# Patient Record
Sex: MALE | Race: WHITE | NOT HISPANIC OR LATINO | Employment: OTHER | URBAN - METROPOLITAN AREA
[De-identification: names, ages, dates, MRNs, and addresses within clinical notes are randomized per-mention and may not be internally consistent; named-entity substitution may affect disease eponyms.]

---

## 2020-03-31 ENCOUNTER — HOSPITAL ENCOUNTER (EMERGENCY)
Facility: HOSPITAL | Age: 70
Discharge: HOME | End: 2020-04-01
Attending: EMERGENCY MEDICINE

## 2020-03-31 ENCOUNTER — APPOINTMENT (OUTPATIENT)
Dept: CT IMAGING | Facility: HOSPITAL | Age: 70
End: 2020-03-31

## 2020-03-31 DIAGNOSIS — E87.1 HYPONATREMIA: ICD-10-CM

## 2020-03-31 DIAGNOSIS — N39.0 URINARY TRACT INFECTION WITHOUT HEMATURIA, SITE UNSPECIFIED: Primary | ICD-10-CM

## 2020-03-31 DIAGNOSIS — R10.9 FLANK PAIN: ICD-10-CM

## 2020-03-31 LAB
ALBUMIN SERPL-MCNC: 4.4 G/DL (ref 3.5–5.2)
ALBUMIN/GLOB SERPL: 1.2 G/DL
ALP SERPL-CCNC: 65 U/L (ref 39–117)
ALT SERPL W P-5'-P-CCNC: 15 U/L (ref 1–41)
ANION GAP SERPL CALCULATED.3IONS-SCNC: 14.4 MMOL/L (ref 5–15)
AST SERPL-CCNC: 24 U/L (ref 1–40)
BACTERIA UR QL AUTO: ABNORMAL /HPF
BASOPHILS # BLD AUTO: 0.04 10*3/MM3 (ref 0–0.2)
BASOPHILS NFR BLD AUTO: 0.3 % (ref 0–1.5)
BILIRUB SERPL-MCNC: 0.6 MG/DL (ref 0.2–1.2)
BILIRUB UR QL STRIP: NEGATIVE
BUN BLD-MCNC: 7 MG/DL (ref 8–23)
BUN/CREAT SERPL: 10 (ref 7–25)
CALCIUM SPEC-SCNC: 9.8 MG/DL (ref 8.6–10.5)
CHLORIDE SERPL-SCNC: 84 MMOL/L (ref 98–107)
CLARITY UR: CLEAR
CO2 SERPL-SCNC: 22.6 MMOL/L (ref 22–29)
COLOR UR: YELLOW
CREAT BLD-MCNC: 0.7 MG/DL (ref 0.76–1.27)
DEPRECATED RDW RBC AUTO: 42.5 FL (ref 37–54)
EOSINOPHIL # BLD AUTO: 0.06 10*3/MM3 (ref 0–0.4)
EOSINOPHIL NFR BLD AUTO: 0.5 % (ref 0.3–6.2)
ERYTHROCYTE [DISTWIDTH] IN BLOOD BY AUTOMATED COUNT: 12.9 % (ref 12.3–15.4)
GFR SERPL CREATININE-BSD FRML MDRD: 112 ML/MIN/1.73
GLOBULIN UR ELPH-MCNC: 3.6 GM/DL
GLUCOSE BLD-MCNC: 112 MG/DL (ref 65–99)
GLUCOSE UR STRIP-MCNC: NEGATIVE MG/DL
HCT VFR BLD AUTO: 48.1 % (ref 37.5–51)
HGB BLD-MCNC: 17.1 G/DL (ref 13–17.7)
HGB UR QL STRIP.AUTO: ABNORMAL
HYALINE CASTS UR QL AUTO: ABNORMAL /LPF
IMM GRANULOCYTES # BLD AUTO: 0.12 10*3/MM3 (ref 0–0.05)
IMM GRANULOCYTES NFR BLD AUTO: 0.9 % (ref 0–0.5)
KETONES UR QL STRIP: ABNORMAL
LEUKOCYTE ESTERASE UR QL STRIP.AUTO: NEGATIVE
LYMPHOCYTES # BLD AUTO: 1.52 10*3/MM3 (ref 0.7–3.1)
LYMPHOCYTES NFR BLD AUTO: 11.4 % (ref 19.6–45.3)
MCH RBC QN AUTO: 32.3 PG (ref 26.6–33)
MCHC RBC AUTO-ENTMCNC: 35.6 G/DL (ref 31.5–35.7)
MCV RBC AUTO: 90.8 FL (ref 79–97)
MONOCYTES # BLD AUTO: 1.35 10*3/MM3 (ref 0.1–0.9)
MONOCYTES NFR BLD AUTO: 10.2 % (ref 5–12)
NEUTROPHILS # BLD AUTO: 10.2 10*3/MM3 (ref 1.7–7)
NEUTROPHILS NFR BLD AUTO: 76.7 % (ref 42.7–76)
NITRITE UR QL STRIP: NEGATIVE
NRBC BLD AUTO-RTO: 0 /100 WBC (ref 0–0.2)
PH UR STRIP.AUTO: 5.5 [PH] (ref 4.5–8)
PLATELET # BLD AUTO: 274 10*3/MM3 (ref 140–450)
PMV BLD AUTO: 9.6 FL (ref 6–12)
POTASSIUM BLD-SCNC: 3.8 MMOL/L (ref 3.5–5.2)
PROT SERPL-MCNC: 8 G/DL (ref 6–8.5)
PROT UR QL STRIP: ABNORMAL
RBC # BLD AUTO: 5.3 10*6/MM3 (ref 4.14–5.8)
RBC # UR: ABNORMAL /HPF
REF LAB TEST METHOD: ABNORMAL
SODIUM BLD-SCNC: 121 MMOL/L (ref 136–145)
SP GR UR STRIP: 1.02 (ref 1–1.03)
SQUAMOUS #/AREA URNS HPF: ABNORMAL /HPF
UROBILINOGEN UR QL STRIP: ABNORMAL
WBC NRBC COR # BLD: 13.29 10*3/MM3 (ref 3.4–10.8)
WBC UR QL AUTO: ABNORMAL /HPF

## 2020-03-31 PROCEDURE — 96365 THER/PROPH/DIAG IV INF INIT: CPT

## 2020-03-31 PROCEDURE — 74176 CT ABD & PELVIS W/O CONTRAST: CPT

## 2020-03-31 PROCEDURE — 25010000002 MORPHINE PER 10 MG: Performed by: EMERGENCY MEDICINE

## 2020-03-31 PROCEDURE — 81001 URINALYSIS AUTO W/SCOPE: CPT | Performed by: PHYSICIAN ASSISTANT

## 2020-03-31 PROCEDURE — 99284 EMERGENCY DEPT VISIT MOD MDM: CPT | Performed by: EMERGENCY MEDICINE

## 2020-03-31 PROCEDURE — 25010000002 THIAMINE PER 100 MG: Performed by: EMERGENCY MEDICINE

## 2020-03-31 PROCEDURE — 96375 TX/PRO/DX INJ NEW DRUG ADDON: CPT

## 2020-03-31 PROCEDURE — 99284 EMERGENCY DEPT VISIT MOD MDM: CPT

## 2020-03-31 PROCEDURE — 96366 THER/PROPH/DIAG IV INF ADDON: CPT

## 2020-03-31 PROCEDURE — 25010000002 ONDANSETRON PER 1 MG: Performed by: PHYSICIAN ASSISTANT

## 2020-03-31 PROCEDURE — 85025 COMPLETE CBC W/AUTO DIFF WBC: CPT | Performed by: PHYSICIAN ASSISTANT

## 2020-03-31 PROCEDURE — 80053 COMPREHEN METABOLIC PANEL: CPT | Performed by: PHYSICIAN ASSISTANT

## 2020-03-31 RX ORDER — ONDANSETRON 2 MG/ML
4 INJECTION INTRAMUSCULAR; INTRAVENOUS ONCE
Status: COMPLETED | OUTPATIENT
Start: 2020-03-31 | End: 2020-03-31

## 2020-03-31 RX ORDER — THIAMINE HYDROCHLORIDE 100 MG/ML
INJECTION, SOLUTION INTRAMUSCULAR; INTRAVENOUS
Status: DISCONTINUED
Start: 2020-03-31 | End: 2020-04-01 | Stop reason: HOSPADM

## 2020-03-31 RX ORDER — RETINOL, ERGOCALCIFEROL, .ALPHA.-TOCOPHEROL ACETATE, DL-, PHYTONADIONE, ASCORBIC ACID, NIACINAMIDE, RIBOFLAVIN 5-PHOSPHATE SODIUM, THIAMINE HYDROCHLORIDE, PYRIDOXINE HYDROCHLORIDE, DEXPANTHENOL, BIOTIN, FOLIC ACID, AND CYANOCOBALAMIN 200-150/10
KIT INTRAVENOUS
Status: DISCONTINUED
Start: 2020-03-31 | End: 2020-04-01 | Stop reason: HOSPADM

## 2020-03-31 RX ORDER — SODIUM CHLORIDE 0.9 % (FLUSH) 0.9 %
10 SYRINGE (ML) INJECTION AS NEEDED
Status: DISCONTINUED | OUTPATIENT
Start: 2020-03-31 | End: 2020-04-01 | Stop reason: HOSPADM

## 2020-03-31 RX ADMIN — THIAMINE HYDROCHLORIDE 250 ML/HR: 100 INJECTION, SOLUTION INTRAMUSCULAR; INTRAVENOUS at 23:37

## 2020-03-31 RX ADMIN — ONDANSETRON 4 MG: 2 INJECTION INTRAMUSCULAR; INTRAVENOUS at 23:09

## 2020-03-31 RX ADMIN — MORPHINE SULFATE 4 MG: 4 INJECTION INTRAVENOUS at 23:31

## 2020-04-01 VITALS
WEIGHT: 148 LBS | RESPIRATION RATE: 20 BRPM | HEART RATE: 88 BPM | SYSTOLIC BLOOD PRESSURE: 183 MMHG | TEMPERATURE: 97.5 F | DIASTOLIC BLOOD PRESSURE: 75 MMHG | BODY MASS INDEX: 25.27 KG/M2 | OXYGEN SATURATION: 92 % | HEIGHT: 64 IN

## 2020-04-01 PROCEDURE — 96366 THER/PROPH/DIAG IV INF ADDON: CPT

## 2020-04-01 RX ORDER — CEFUROXIME AXETIL 250 MG/1
500 TABLET ORAL ONCE
Status: COMPLETED | OUTPATIENT
Start: 2020-04-01 | End: 2020-04-01

## 2020-04-01 RX ADMIN — CEFUROXIME AXETIL 500 MG: 250 TABLET, FILM COATED ORAL at 01:59

## 2020-04-01 NOTE — DISCHARGE INSTRUCTIONS
Start taking the antibiotic medication that you were previously prescribed by your primary care doctor this week.  Stop drinking alcohol.  Please return to the emergency room for any worsening pain, fevers, nausea, vomiting, weakness, difficulties urinating or any other concerns.

## 2020-04-01 NOTE — ED PROVIDER NOTES
EMERGENCY DEPARTMENT ENCOUNTER      Room Number: 4/04    History is provided by the patient, no translation services needed    HPI:    Chief complaint: Back pain, abdominal pain, nausea    Location: Bilateral thoracic/lumbar region    Quality/Severity: 8/10, sharp    Timing/Duration: Pain began yesterday    Modifying Factors: Saw PCP today, informed she had a UTI, was given injection of unknown antibiotics, patient given an unknown prescription, has not yet filled this.    Associated Symptoms: Positive for back pain, abdominal pain, nausea.  Denies any chest pain, shortness of air, vomiting, diarrhea, constipation, dysuria, hematuria, urgency or frequency to urinate.    Narrative: Pt is a 69 y.o. male who presents complaining of the above symptoms.  Patient reports he began having pain in his back yesterday, he denies any injury.  He is also experiencing some left lower quadrant abdominal pain and nausea that have seemed to worsen throughout the day.  He states he was seen by his PCP earlier today and given a dose of IM antibiotics.  He has not yet started taking oral antibiotics.  He denies any history of kidney stones.  He does have a history of emphysema, pancreatitis, HTN, HLD, hypothyroidism, and arthritis.     PMD: Lokesh Seay MD    REVIEW OF SYSTEMS  Review of Systems   Constitutional: Negative for chills and fever.   Respiratory: Negative for cough and shortness of breath.    Cardiovascular: Negative for chest pain and palpitations.   Gastrointestinal: Positive for abdominal pain and nausea. Negative for blood in stool, constipation, diarrhea and vomiting.   Genitourinary: Positive for flank pain (Left). Negative for difficulty urinating, dysuria, frequency, hematuria and urgency.   Musculoskeletal: Positive for back pain. Negative for neck pain.   Skin: Negative for pallor and rash.   Neurological: Negative for dizziness and syncope.   Psychiatric/Behavioral: Negative for confusion. The patient is  not nervous/anxious.          PAST MEDICAL HISTORY  Active Ambulatory Problems     Diagnosis Date Noted   • Chronic pancreatitis (CMS/HCC) 03/14/2016     Resolved Ambulatory Problems     Diagnosis Date Noted   • No Resolved Ambulatory Problems     Past Medical History:   Diagnosis Date   • Arthritis    • Emphysema lung (CMS/HCC)    • High cholesterol    • Hypertension    • Pancreatitis    • Shortness of breath    • Thyroid disease        PAST SURGICAL HISTORY  Past Surgical History:   Procedure Laterality Date   • CATARACT EXTRACTION     • HEMORRHOIDECTOMY     • NEPHRECTOMY PARTIAL         FAMILY HISTORY  Family History   Problem Relation Age of Onset   • Stroke Mother        SOCIAL HISTORY  Social History     Socioeconomic History   • Marital status: Single     Spouse name: Not on file   • Number of children: Not on file   • Years of education: Not on file   • Highest education level: Not on file   Tobacco Use   • Smoking status: Current Every Day Smoker     Packs/day: 1.50     Types: Cigarettes   Substance and Sexual Activity   • Alcohol use: Yes     Alcohol/week: 35.0 standard drinks     Types: 35 Cans of beer per week     Comment: 5-6 beer per day   • Drug use: Never   • Sexual activity: Defer       ALLERGIES  Patient has no known allergies.      Current Facility-Administered Medications:   •  multiple vitamin (M.V.I. Adult) injection  - ADS Override Pull, , , ,   •  [COMPLETED] Insert peripheral IV, , , Once **AND** sodium chloride 0.9 % flush 10 mL, 10 mL, Intravenous, PRN, Inessa Boggs PA-C  •  thiamine (B-1) 100 MG/ML injection  - ADS Override Pull, , , ,     Current Outpatient Medications:   •  albuterol (PROVENTIL HFA;VENTOLIN HFA) 108 (90 BASE) MCG/ACT inhaler, ProAir  (90 Base) MCG/ACT Inhalation Aerosol Solution; Patient Sig: ProAir  (90 Base) MCG/ACT Inhalation Aerosol Solution ; 8; 0; 19-Nov-2014; Active, Disp: , Rfl:   •  amLODIPine (NORVASC) 10 MG tablet, Take by mouth., Disp:  , Rfl:   •  levothyroxine (SYNTHROID, LEVOTHROID) 50 MCG tablet, Take by mouth., Disp: , Rfl:   •  omeprazole (PriLOSEC) 40 MG capsule, Take by mouth., Disp: , Rfl:   •  polyethylene glycol (GoLYTELY) 236 G solution, Take by mouth. As directed, Disp: , Rfl:   •  rosuvastatin (CRESTOR) 40 MG tablet, Take by mouth., Disp: , Rfl:   •  tiotropium (SPIRIVA) 18 MCG per inhalation capsule, Spiriva HandiHaler 18 MCG Inhalation Capsule; Patient Sig: Spiriva HandiHaler 18 MCG Inhalation Capsule ; 30; 0; 19-Nov-2014; Active, Disp: , Rfl:   •  ZENPEP 84935 UNITS capsule delayed-release particles capsule, TAKE TWO CAPSULES BY MOUTH THREE TIMES A DAY BEFORE MEALS, Disp: 180 capsule, Rfl: 11    PHYSICAL EXAM  ED Triage Vitals   Temp Heart Rate Resp BP SpO2   03/31/20 2213 03/31/20 2213 03/31/20 2222 03/31/20 2222 03/31/20 2213   97.5 °F (36.4 °C) 88 20 178/82 97 %      Temp src Heart Rate Source Patient Position BP Location FiO2 (%)   03/31/20 2213 03/31/20 2213 03/31/20 2213 03/31/20 2213 --   Oral Monitor Sitting Left arm        Physical Exam   Constitutional: He is oriented to person, place, and time and well-developed, well-nourished, and in no distress.  Non-toxic appearance. No distress.   HENT:   Head: Normocephalic and atraumatic.   Mouth/Throat: Uvula is midline, oropharynx is clear and moist and mucous membranes are normal.   Eyes: Pupils are equal, round, and reactive to light. Conjunctivae are normal.   Cardiovascular: Normal rate, regular rhythm and intact distal pulses.   Pulmonary/Chest: Effort normal. He has wheezes (Expiratory wheezing throughout all fields).   Abdominal: Soft. Bowel sounds are normal. There is tenderness in the left lower quadrant. There is CVA tenderness (Left). There is no guarding.   Musculoskeletal: Normal range of motion. He exhibits no edema.   Neurological: He is alert and oriented to person, place, and time. GCS score is 15.   Skin: Skin is warm and dry.   Psychiatric: Mood, memory,  affect and judgment normal.   Nursing note and vitals reviewed.        LAB RESULTS  Results for orders placed or performed during the hospital encounter of 03/31/20   Urinalysis With Microscopic If Indicated (No Culture) - Urine, Clean Catch   Result Value Ref Range    Color, UA Yellow Yellow, Straw    Appearance, UA Clear Clear    pH, UA 5.5 4.5 - 8.0    Specific Gravity, UA 1.020 1.003 - 1.030    Glucose, UA Negative Negative    Ketones, UA 15 mg/dL (1+) (A) Negative    Bilirubin, UA Negative Negative    Blood, UA Trace (A) Negative    Protein,  mg/dL (2+) (A) Negative    Leuk Esterase, UA Negative Negative    Nitrite, UA Negative Negative    Urobilinogen, UA 0.2 E.U./dL 0.2 - 1.0 E.U./dL   Comprehensive Metabolic Panel   Result Value Ref Range    Glucose 112 (H) 65 - 99 mg/dL    BUN 7 (L) 8 - 23 mg/dL    Creatinine 0.70 (L) 0.76 - 1.27 mg/dL    Sodium 121 (L) 136 - 145 mmol/L    Potassium 3.8 3.5 - 5.2 mmol/L    Chloride 84 (L) 98 - 107 mmol/L    CO2 22.6 22.0 - 29.0 mmol/L    Calcium 9.8 8.6 - 10.5 mg/dL    Total Protein 8.0 6.0 - 8.5 g/dL    Albumin 4.40 3.50 - 5.20 g/dL    ALT (SGPT) 15 1 - 41 U/L    AST (SGOT) 24 1 - 40 U/L    Alkaline Phosphatase 65 39 - 117 U/L    Total Bilirubin 0.6 0.2 - 1.2 mg/dL    eGFR Non African Amer 112 >60 mL/min/1.73    Globulin 3.6 gm/dL    A/G Ratio 1.2 g/dL    BUN/Creatinine Ratio 10.0 7.0 - 25.0    Anion Gap 14.4 5.0 - 15.0 mmol/L   CBC Auto Differential   Result Value Ref Range    WBC 13.29 (H) 3.40 - 10.80 10*3/mm3    RBC 5.30 4.14 - 5.80 10*6/mm3    Hemoglobin 17.1 13.0 - 17.7 g/dL    Hematocrit 48.1 37.5 - 51.0 %    MCV 90.8 79.0 - 97.0 fL    MCH 32.3 26.6 - 33.0 pg    MCHC 35.6 31.5 - 35.7 g/dL    RDW 12.9 12.3 - 15.4 %    RDW-SD 42.5 37.0 - 54.0 fl    MPV 9.6 6.0 - 12.0 fL    Platelets 274 140 - 450 10*3/mm3    Neutrophil % 76.7 (H) 42.7 - 76.0 %    Lymphocyte % 11.4 (L) 19.6 - 45.3 %    Monocyte % 10.2 5.0 - 12.0 %    Eosinophil % 0.5 0.3 - 6.2 %    Basophil % 0.3  0.0 - 1.5 %    Immature Grans % 0.9 (H) 0.0 - 0.5 %    Neutrophils, Absolute 10.20 (H) 1.70 - 7.00 10*3/mm3    Lymphocytes, Absolute 1.52 0.70 - 3.10 10*3/mm3    Monocytes, Absolute 1.35 (H) 0.10 - 0.90 10*3/mm3    Eosinophils, Absolute 0.06 0.00 - 0.40 10*3/mm3    Basophils, Absolute 0.04 0.00 - 0.20 10*3/mm3    Immature Grans, Absolute 0.12 (H) 0.00 - 0.05 10*3/mm3    nRBC 0.0 0.0 - 0.2 /100 WBC   Urinalysis, Microscopic Only - Urine, Clean Catch   Result Value Ref Range    RBC, UA 0-2 (A) None Seen /HPF    WBC, UA 3-5 (A) None Seen /HPF    Bacteria, UA Trace (A) None Seen /HPF    Squamous Epithelial Cells, UA 0-2 None Seen, 0-2 /HPF    Hyaline Casts, UA None Seen None Seen /LPF    Methodology Manual Light Microscopy          I ordered the above labs and reviewed the results    RADIOLOGY  RADIOLOGY        Study: CT abdomen and pelvis without contrast    Findings: 1. Motion degraded exam.  2. No ureteral stones and no hydronephrosis. Calcifications in the kidneys are likely all vascular, but small nonobstructing kidney stones may be present as well.  3. Fat stranding around both kidneys may indicate pyelonephritis. Correlate with urinalysis results.  4. Chronic calcific pancreatitis. No evidence of acute pancreatitis at this time.  5. No acute findings in the GI tract. Normal appendix.  6. Additional findings as above.    Interpreted contemporaneously with treatment by Dr. Mendoza, independently viewed by me      I ordered the above radiologic testing and reviewed the results    PROCEDURES  Procedures      PROGRESS AND CONSULTS  ED Course as of Apr 01 0459   Tue Mar 31, 2020   0266 Patient presented with complaints of generalized thoracic/lumbar back pain, and generalized lower abdominal pain.  After performing physical exam patient is found to be tender in the left flank and left lower quadrant of his abdomen.  CT abdomen pelvis without contrast ordered to evaluate for stone disease.  Patient's case was handed  "over to Dr. Treviño who is in agreement with plan of care.    [KS]   Wed Apr 01, 2020 0215 I have reviewed the labs and the CT report from today's visit.  CT indicates nonspecific inflammatory changes around the kidneys which could represent UTI pyelonephritis problem.  Patient does have trace bacteria in the urine and 3-5 white blood cells.  He was started on antibiotic therapy yesterday when he received an intramuscular injection by his PCP.  He never started his oral prescription tablet antibiotics, however.  I will start him on Ceftin antibiotics right now.  I also find that his sodium is rather low at 121.  Careful review of his records indicates that this is probably more of a chronic problem.  I suspect is related to his alcohol abuse and poor nutrition overall.  I have started a banana bag on him with 0.9% normal saline.  We will infuse that slowly over several hours tonight.  Assuming he does well his pain is well controlled, I think he can probably be discharged early in the morning.  I will of course insist that he starts taking the antibiotic prescription that he was originally prescribed yesterday.  I encouraged him to follow-up with his primary care doctor.  Also reviewed with him the usual \"return to ER\" instructions for any worsening signs or symptoms prior to his departure.    [RICKY]      ED Course User Index  [RICKY] David Treviño MD  [KS] Inessa Boggs PA-C           MEDICAL DECISION MAKING  Results were reviewed/discussed with the patient and they were also made aware of online access. Pt also made aware that some labs, such as cultures, will not be resulted during ER visit and follow up with PMD is necessary.     MDM        My differential diagnosis for back pain includes but is not limited to:  Musculoskeletal strain, contusion, retroperitoneal hematoma, disc protrusion, vertebral fracture, transverse process fracture, rib fracture, facet syndrome, sacroiliac joint strain, sciatica, renal " injury, splenic injury, pancreatic injury, osteoarthritis, lumbar spondylosis, spinal stenosis, ankylosing spondylitis, sacroiliac joint inflammation, pancreatitis, perforated peptic ulcer, diverticulitis, endometriosis, chronic PID, epidural abscess, osteomyelitis, retroperitoneal abscess, pyelonephritis, pneumonia, subphrenic abscess, tuberculosis, neurofibroma, meningioma, multiple myeloma, lymphoma, metastatic cancer, primary cancer, AAA, aortic dissection, spinal ischemia, referred pain, ureterolithiasis      DIAGNOSIS  Final diagnoses:   Urinary tract infection without hematuria, site unspecified   Flank pain   Hyponatremia       Latest Documented Vital Signs:  As of 04:59  BP- (!) 183/75 HR- 88 Temp- 97.5 °F (36.4 °C) (Oral) O2 sat- 92%    DISPOSITION  Home    Discussed pertinent labs and imaging findings with the patient/family.  Patient/Family voiced understanding of need to follow-up for recheck, further testing as needed.  Return to the emergency Department warnings were given.         Medication List      No changes were made to your prescriptions during this visit.           Follow-up Information     Lokesh Seay MD. Call today.    Specialty:  Family Medicine  Why:  Call your doctor today to schedule a prompt follow-up appointment this week for repeat evaluation as we discussed.  Contact information:  [address] [place] [address] [address]  [facility] [id number] [phone]                     Dictated utilizing Dragon dictation       David Treviño MD  04/01/20 5983

## 2020-06-17 ENCOUNTER — TRANSCRIBE ORDERS (OUTPATIENT)
Dept: ADMINISTRATIVE | Facility: HOSPITAL | Age: 70
End: 2020-06-17

## 2020-06-17 DIAGNOSIS — Z12.2 ENCOUNTER FOR SCREENING FOR LUNG CANCER: ICD-10-CM

## 2020-06-17 DIAGNOSIS — R94.31 ABNORMAL EKG: Primary | ICD-10-CM

## 2020-06-22 ENCOUNTER — APPOINTMENT (OUTPATIENT)
Dept: CARDIOLOGY | Facility: HOSPITAL | Age: 70
End: 2020-06-22

## 2021-07-09 ENCOUNTER — OFFICE VISIT (OUTPATIENT)
Dept: CARDIOLOGY | Facility: CLINIC | Age: 71
End: 2021-07-09

## 2021-07-09 VITALS
HEART RATE: 100 BPM | WEIGHT: 155 LBS | SYSTOLIC BLOOD PRESSURE: 120 MMHG | DIASTOLIC BLOOD PRESSURE: 70 MMHG | HEIGHT: 64 IN | BODY MASS INDEX: 26.46 KG/M2 | OXYGEN SATURATION: 99 %

## 2021-07-09 DIAGNOSIS — I10 ESSENTIAL HYPERTENSION: ICD-10-CM

## 2021-07-09 DIAGNOSIS — J43.8 OTHER EMPHYSEMA (HCC): ICD-10-CM

## 2021-07-09 DIAGNOSIS — R06.09 DYSPNEA ON EXERTION: ICD-10-CM

## 2021-07-09 DIAGNOSIS — E78.2 MIXED HYPERLIPIDEMIA: ICD-10-CM

## 2021-07-09 DIAGNOSIS — I48.91 ATRIAL FIBRILLATION, UNSPECIFIED TYPE (HCC): Primary | ICD-10-CM

## 2021-07-09 DIAGNOSIS — K86.1 CHRONIC PANCREATITIS, UNSPECIFIED PANCREATITIS TYPE (HCC): ICD-10-CM
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
## 2021-07-09 PROBLEM — S01.01XA LACERATION OF SCALP WITHOUT FOREIGN BODY: Status: ACTIVE | Noted: 2019-12-20

## 2021-07-09 PROBLEM — E87.1 HYPONATREMIA: Status: ACTIVE | Noted: 2019-12-20

## 2021-07-09 PROBLEM — R06.2 WHEEZING: Status: ACTIVE | Noted: 2019-12-21

## 2021-07-09 PROBLEM — F10.929 ALCOHOL INTOXICATION (HCC): Status: ACTIVE | Noted: 2019-12-20

## 2021-07-09 PROCEDURE — 93000 ELECTROCARDIOGRAM COMPLETE: CPT | Performed by: INTERNAL MEDICINE

## 2021-07-09 PROCEDURE — 99204 OFFICE O/P NEW MOD 45 MIN: CPT | Performed by: INTERNAL MEDICINE

## 2021-07-09 NOTE — PROGRESS NOTES
Date of Office Visit: 2021  Encounter Provider: Eloisa Wheeler MD  Place of Service: [facility] CARDIOLOGY  Patient Name: Jose Manuel Wray  :1950      Patient ID:  Jose Manuel Wray is a 70 y.o. male is here for atrial fibrillation.          History of Present Illness    He has a history of hypertension, hyperlipidemia, COPD, hypothyroidism, history of pancreatitis.    He is  and lives alone, is retired , smokes 1ppd cigarettes for 55 years, 2 to 3 cups of coffee per day, 2 cans of Pepsi per day, 2-3 beers daily.  He has a history of alcoholism.  His mother had strokes.    He was at his PCP office and was found to have new atrial fibrillation.  He does not feels heart racing or skipping and has had no dizziness or syncope.  He has no nausea.  He has no orthopnea or PND.  He has chronic dyspnea on exertion which he thinks is due to COPD.  He has had no exertional chest tightness or pressure.  He has no fevers, chills or cough.  He has no lower extremity edema.  Labs done 2021 show normal CMP, total cholesterol 202, triglycerides 41, HDL 06, LDL 88, normal TSH, normal PSA, normal CBC.    Past Medical History:   Diagnosis Date   • Arthritis    • Emphysema lung (CMS/HCC)    • High cholesterol    • Hypertension    • Pancreatitis    • Shortness of breath    • Thyroid disease          Past Surgical History:   Procedure Laterality Date   • CATARACT EXTRACTION     • HEMORRHOIDECTOMY     • NEPHRECTOMY PARTIAL         Current Outpatient Medications on File Prior to Visit   Medication Sig Dispense Refill   • albuterol (PROVENTIL HFA;VENTOLIN HFA) 108 (90 BASE) MCG/ACT inhaler ProAir  (90 Base) MCG/ACT Inhalation Aerosol Solution; Patient Sig: ProAir  (90 Base) MCG/ACT Inhalation Aerosol Solution ; 8; 0; -2014; Active     • amLODIPine (NORVASC) 10 MG tablet Take by mouth.     • levothyroxine (SYNTHROID, LEVOTHROID) 50 MCG tablet Take by  "mouth.     • omeprazole (PriLOSEC) 40 MG capsule Take by mouth.     • polyethylene glycol (GoLYTELY) 236 G solution Take by mouth. As directed     • rosuvastatin (CRESTOR) 40 MG tablet Take by mouth.     • tiotropium (SPIRIVA) 18 MCG per inhalation capsule Spiriva HandiHaler 18 MCG Inhalation Capsule; Patient Sig: Spiriva HandiHaler 18 MCG Inhalation Capsule ; 30; 0; 19-Nov-2014; Active     • ZENPEP 53701 UNITS capsule delayed-release particles capsule TAKE TWO CAPSULES BY MOUTH THREE TIMES A DAY BEFORE MEALS 180 capsule 11     No current facility-administered medications on file prior to visit.       Social History     Socioeconomic History   • Marital status: Single     Spouse name: Not on file   • Number of children: Not on file   • Years of education: Not on file   • Highest education level: Not on file   Tobacco Use   • Smoking status: Current Every Day Smoker     Packs/day: 1.50     Types: Cigarettes   • Smokeless tobacco: Never Used   Vaping Use   • Vaping Use: Never assessed   Substance and Sexual Activity   • Alcohol use: Yes     Alcohol/week: 35.0 standard drinks     Types: 35 Cans of beer per week     Comment: 5-6 beer per day   • Drug use: Never   • Sexual activity: Defer           ROS    Procedures    ECG 12 Lead    Date/Time: 7/9/2021 11:39 AM  Performed by: Eloisa Wheeler MD  Authorized by: Eloisa Wheeler MD   Comparison: compared with previous ECG   Similar to previous ECG  Rhythm: atrial fibrillation    Clinical impression: abnormal EKG                Objective:      Vitals:    07/09/21 1114   BP: 120/70   Pulse: 100   SpO2: 99%   Weight: 70.3 kg (155 lb)   Height: 162.6 cm (64\")     Body mass index is 26.61 kg/m².    Vitals reviewed.   Constitutional:       General: Not in acute distress.     Appearance: Well-developed. Not diaphoretic.   Eyes:      General: No scleral icterus.     Conjunctiva/sclera: Conjunctivae normal.   HENT:      Head: Normocephalic and atraumatic.   Neck:      " Thyroid: No thyromegaly.      Vascular: No carotid bruit or JVD.      Lymphadenopathy: No cervical adenopathy.   Pulmonary:      Effort: Pulmonary effort is normal. No respiratory distress.      Breath sounds: Decreased breath sounds present. No wheezing. No rhonchi. No rales.   Chest:      Chest wall: Not tender to palpatation.   Cardiovascular:      Tachycardia present. Irregularly irregular rhythm.      Murmurs: There is no murmur.      No gallop.   Pulses:     Intact distal pulses.   Edema:     Peripheral edema absent.   Abdominal:      General: Bowel sounds are normal. There is no distension or abdominal bruit.      Palpations: Abdomen is soft. There is no abdominal mass.      Tenderness: There is no abdominal tenderness.   Musculoskeletal:         General: No deformity.      Extremities: No clubbing present.     Cervical back: Neck supple. Skin:     General: Skin is warm and dry. There is no cyanosis.      Coloration: Skin is not pale.      Findings: No rash.   Neurological:      Mental Status: Alert and oriented to person, place, and time.      Cranial Nerves: No cranial nerve deficit.   Psychiatric:         Judgment: Judgment normal.         Lab Review:       Assessment:      Diagnosis Plan   1. Atrial fibrillation, unspecified type (CMS/HCC)  Adult Transthoracic Echo Complete W/ Cont if Necessary Per Protocol    Stress Test With Myocardial Perfusion One Day   2. Chronic pancreatitis, unspecified pancreatitis type (CMS/HCC)     3. Dyspnea on exertion  Adult Transthoracic Echo Complete W/ Cont if Necessary Per Protocol    Stress Test With Myocardial Perfusion One Day   4. Essential hypertension     5. Mixed hyperlipidemia     6. Other emphysema (CMS/HCC)       1. New onset atrial fibrillation.  Start Eliquis 5 mg twice daily and metoprolol 25 twice daily.  2. COPD  3. Tobacco use, advised cessation but he says he is been tried for 25 years and cannot.  4. Hypertension, well controlled  5. Hyperlipidemia, on  rosuvastatin.  6. History of alcoholism  7. History of pancreatitis     Plan:       Start Eliquis 5 mg twice daily, set up as nuclear perfusion study to be done at [facility] as well as echocardiogram.  See Radha in 6 to 8 weeks to reevaluate heart rate.  Start Lopressor 25 mg twice daily.

## 2021-07-21 ENCOUNTER — HOSPITAL ENCOUNTER (OUTPATIENT)
Dept: CARDIOLOGY | Facility: HOSPITAL | Age: 71
Discharge: HOME | End: 2021-07-21

## 2021-07-21 ENCOUNTER — HOSPITAL ENCOUNTER (OUTPATIENT)
Dept: NUCLEAR MEDICINE | Facility: HOSPITAL | Age: 71
Discharge: HOME | End: 2021-07-21

## 2021-07-21 ENCOUNTER — TELEPHONE (OUTPATIENT)
Dept: CARDIOLOGY | Facility: CLINIC | Age: 71
End: 2021-07-21

## 2021-07-21 VITALS
HEIGHT: 64 IN | BODY MASS INDEX: 26.46 KG/M2 | WEIGHT: 155 LBS | SYSTOLIC BLOOD PRESSURE: 120 MMHG | DIASTOLIC BLOOD PRESSURE: 70 MMHG

## 2021-07-21 DIAGNOSIS — R06.09 DYSPNEA ON EXERTION: ICD-10-CM

## 2021-07-21 DIAGNOSIS — I48.91 ATRIAL FIBRILLATION, UNSPECIFIED TYPE (HCC): ICD-10-CM

## 2021-07-21 LAB
AORTIC DIMENSIONLESS INDEX: 0.89 (DI)
BH CV ECHO MEAS - ACS: 2.1 CM
BH CV ECHO MEAS - AO MAX PG: 5 MMHG
BH CV ECHO MEAS - AO MEAN PG (FULL): -0.5 MMHG
BH CV ECHO MEAS - AO MEAN PG: 2.5 MMHG
BH CV ECHO MEAS - AO ROOT AREA (BSA CORRECTED): 1.8
BH CV ECHO MEAS - AO ROOT AREA: 7.5 CM^2
BH CV ECHO MEAS - AO ROOT DIAM: 3.1 CM
BH CV ECHO MEAS - AO V2 MAX: 129 CM/SEC
BH CV ECHO MEAS - AO V2 MEAN: 74.5 CM/SEC
BH CV ECHO MEAS - AO V2 VTI: 27 CM
BH CV ECHO MEAS - AVA(I,A): 3 CM^2
BH CV ECHO MEAS - AVA(I,D): 3 CM^2
BH CV ECHO MEAS - BSA(HAYCOCK): 1.8 M^2
BH CV ECHO MEAS - BSA: 1.8 M^2
BH CV ECHO MEAS - BZI_BMI: 26.6 KILOGRAMS/M^2
BH CV ECHO MEAS - BZI_METRIC_HEIGHT: 162.6 CM
BH CV ECHO MEAS - BZI_METRIC_WEIGHT: 70.3 KG
BH CV ECHO MEAS - EDV(CUBED): 67.9 ML
BH CV ECHO MEAS - EDV(MOD-SP2): 74.9 ML
BH CV ECHO MEAS - EDV(MOD-SP4): 97.5 ML
BH CV ECHO MEAS - EDV(TEICH): 73.4 ML
BH CV ECHO MEAS - EF(CUBED): 80.4 %
BH CV ECHO MEAS - EF(MOD-BP): 62.1 %
BH CV ECHO MEAS - EF(MOD-SP2): 65.2 %
BH CV ECHO MEAS - EF(MOD-SP4): 58.8 %
BH CV ECHO MEAS - EF(TEICH): 73.4 %
BH CV ECHO MEAS - ESV(CUBED): 13.3 ML
BH CV ECHO MEAS - ESV(MOD-SP2): 26.1 ML
BH CV ECHO MEAS - ESV(MOD-SP4): 40.2 ML
BH CV ECHO MEAS - ESV(TEICH): 19.5 ML
BH CV ECHO MEAS - FS: 41.9 %
BH CV ECHO MEAS - IVS/LVPW: 0.79
BH CV ECHO MEAS - IVSD: 0.97 CM
BH CV ECHO MEAS - LAT PEAK E' VEL: 11.2 CM/SEC
BH CV ECHO MEAS - LV DIASTOLIC VOL/BSA (35-75): 55.5 ML/M^2
BH CV ECHO MEAS - LV MASS(C)D: 150 GRAMS
BH CV ECHO MEAS - LV MASS(C)DI: 85.4 GRAMS/M^2
BH CV ECHO MEAS - LV MAX PG: 7.5 MMHG
BH CV ECHO MEAS - LV MEAN PG: 3 MMHG
BH CV ECHO MEAS - LV SYSTOLIC VOL/BSA (12-30): 22.9 ML/M^2
BH CV ECHO MEAS - LV V1 MAX: 137 CM/SEC
BH CV ECHO MEAS - LV V1 MEAN: 72 CM/SEC
BH CV ECHO MEAS - LV V1 VTI: 26.2 CM
BH CV ECHO MEAS - LVIDD: 4.1 CM
BH CV ECHO MEAS - LVIDS: 2.4 CM
BH CV ECHO MEAS - LVLD AP2: 7.8 CM
BH CV ECHO MEAS - LVLD AP4: 7.6 CM
BH CV ECHO MEAS - LVLS AP2: 6.5 CM
BH CV ECHO MEAS - LVLS AP4: 6.2 CM
BH CV ECHO MEAS - LVOT AREA (M): 3.1 CM^2
BH CV ECHO MEAS - LVOT AREA: 3.1 CM^2
BH CV ECHO MEAS - LVOT DIAM: 2 CM
BH CV ECHO MEAS - LVPWD: 1.2 CM
BH CV ECHO MEAS - MED PEAK E' VEL: 11.1 CM/SEC
BH CV ECHO MEAS - MR MAX PG: 47.1 MMHG
BH CV ECHO MEAS - MR MAX VEL: 343 CM/SEC
BH CV ECHO MEAS - MV DEC SLOPE: 496.5 CM/SEC^2
BH CV ECHO MEAS - MV DEC TIME: 177 SEC
BH CV ECHO MEAS - MV E MAX VEL: 124 CM/SEC
BH CV ECHO MEAS - MV MEAN PG: 2 MMHG
BH CV ECHO MEAS - MV P1/2T MAX VEL: 130 CM/SEC
BH CV ECHO MEAS - MV P1/2T: 76.7 MSEC
BH CV ECHO MEAS - MV V2 MEAN: 65.9 CM/SEC
BH CV ECHO MEAS - MV V2 VTI: 34.8 CM
BH CV ECHO MEAS - MVA P1/2T LCG: 1.7 CM^2
BH CV ECHO MEAS - MVA(P1/2T): 2.9 CM^2
BH CV ECHO MEAS - MVA(VTI): 2.4 CM^2
BH CV ECHO MEAS - RAP SYSTOLE: 3 MMHG
BH CV ECHO MEAS - RVOT AREA: 5.7 CM^2
BH CV ECHO MEAS - RVOT DIAM: 2.7 CM
BH CV ECHO MEAS - SI(AO): 116.1 ML/M^2
BH CV ECHO MEAS - SI(CUBED): 31.1 ML/M^2
BH CV ECHO MEAS - SI(LVOT): 46.9 ML/M^2
BH CV ECHO MEAS - SI(MOD-SP2): 27.8 ML/M^2
BH CV ECHO MEAS - SI(MOD-SP4): 32.6 ML/M^2
BH CV ECHO MEAS - SI(TEICH): 30.7 ML/M^2
BH CV ECHO MEAS - SV(AO): 203.8 ML
BH CV ECHO MEAS - SV(CUBED): 54.6 ML
BH CV ECHO MEAS - SV(LVOT): 82.3 ML
BH CV ECHO MEAS - SV(MOD-SP2): 48.8 ML
BH CV ECHO MEAS - SV(MOD-SP4): 57.3 ML
BH CV ECHO MEAS - SV(TEICH): 53.8 ML
BH CV ECHO MEAS - TAPSE (>1.6): 2 CM
BH CV ECHO MEASUREMENTS AVERAGE E/E' RATIO: 11.12
BH CV NUCLEAR PRIOR STUDY: 3
BH CV REST NUCLEAR ISOTOPE DOSE: 12 MCI
BH CV STRESS BP STAGE 1: NORMAL
BH CV STRESS COMMENTS STAGE 1: NORMAL
BH CV STRESS DOSE REGADENOSON STAGE 1: 0.4
BH CV STRESS DURATION MIN STAGE 1: 0
BH CV STRESS DURATION SEC STAGE 1: 30
BH CV STRESS HR STAGE 1: 61
BH CV STRESS NUCLEAR ISOTOPE DOSE: 35.3 MCI
BH CV STRESS O2 STAGE 1: 97
BH CV STRESS PROTOCOL 1: NORMAL
BH CV STRESS RECOVERY BP: NORMAL MMHG
BH CV STRESS RECOVERY HR: 103 BPM
BH CV STRESS RECOVERY O2: 95 %
BH CV STRESS STAGE 1: 1
BH CV XLRA - TDI S': 12 CM/SEC
LEFT ATRIUM VOLUME INDEX: 26 ML/M2
LV EF 2D ECHO EST: 60 %
LV EF NUC BP: 77 %
MAXIMAL PREDICTED HEART RATE: 150 BPM
MAXIMAL PREDICTED HEART RATE: 150 BPM
PERCENT MAX PREDICTED HR: 90 %
SINUS: 2.9 CM
STRESS BASELINE BP: NORMAL MMHG
STRESS BASELINE HR: 63 BPM
STRESS O2 SAT REST: 96 %
STRESS PERCENT HR: 106 %
STRESS POST ESTIMATED WORKLOAD: 1 METS
STRESS POST EXERCISE DUR SEC: 30 SEC
STRESS POST O2 SAT PEAK: 97 %
STRESS POST PEAK BP: NORMAL MMHG
STRESS POST PEAK HR: 135 BPM
STRESS TARGET HR: 128 BPM
STRESS TARGET HR: 128 BPM

## 2021-07-21 PROCEDURE — 93018 CV STRESS TEST I&R ONLY: CPT | Performed by: INTERNAL MEDICINE

## 2021-07-21 PROCEDURE — 78452 HT MUSCLE IMAGE SPECT MULT: CPT | Performed by: INTERNAL MEDICINE

## 2021-07-21 PROCEDURE — 78452 HT MUSCLE IMAGE SPECT MULT: CPT

## 2021-07-21 PROCEDURE — 25010000002 REGADENOSON 0.4 MG/5ML SOLUTION: Performed by: INTERNAL MEDICINE

## 2021-07-21 PROCEDURE — 93306 TTE W/DOPPLER COMPLETE: CPT

## 2021-07-21 PROCEDURE — 93306 TTE W/DOPPLER COMPLETE: CPT | Performed by: INTERNAL MEDICINE

## 2021-07-21 PROCEDURE — 0 TECHNETIUM SESTAMIBI: Performed by: INTERNAL MEDICINE

## 2021-07-21 PROCEDURE — 93016 CV STRESS TEST SUPVJ ONLY: CPT | Performed by: INTERNAL MEDICINE

## 2021-07-21 PROCEDURE — 93017 CV STRESS TEST TRACING ONLY: CPT

## 2021-07-21 PROCEDURE — A9500 TC99M SESTAMIBI: HCPCS | Performed by: INTERNAL MEDICINE

## 2021-07-21 RX ADMIN — TECHNETIUM TC 99M SESTAMIBI 1 DOSE: 1 INJECTION INTRAVENOUS at 07:20

## 2021-07-21 RX ADMIN — TECHNETIUM TC 99M SESTAMIBI 1 DOSE: 1 INJECTION INTRAVENOUS at 08:46

## 2021-07-21 RX ADMIN — REGADENOSON 0.4 MG: 0.08 INJECTION, SOLUTION INTRAVENOUS at 08:46

## 2021-07-21 NOTE — TELEPHONE ENCOUNTER
See both attached msgs. Attempted to call patient. No answer and no option to leave a VM. Will continue to try to reach patient.     Thank you,     Gricel Chin, RN  Triage [facility] [place]

## 2021-07-21 NOTE — TELEPHONE ENCOUNTER
Attempted to call patient. No answer and no option to leave a VM. Will continue to try to reach patient.    Thank you,    Gricel Chin RN  Triage [facility] [place]

## 2021-07-22 NOTE — TELEPHONE ENCOUNTER
See both attached msgs. Attempted to call patient. No answer and no option to leave a VM. Will continue to try to reach patient.     Thank you,     Gricel Chin, RN  Triage [facility] [place]

## 2021-08-30 ENCOUNTER — OFFICE VISIT (OUTPATIENT)
Dept: CARDIOLOGY | Facility: CLINIC | Age: 71
End: 2021-08-30

## 2021-08-30 VITALS
DIASTOLIC BLOOD PRESSURE: 70 MMHG | HEART RATE: 65 BPM | HEIGHT: 64 IN | RESPIRATION RATE: 16 BRPM | BODY MASS INDEX: 26.46 KG/M2 | WEIGHT: 155 LBS | SYSTOLIC BLOOD PRESSURE: 150 MMHG | OXYGEN SATURATION: 95 %

## 2021-08-30 DIAGNOSIS — I48.91 ATRIAL FIBRILLATION, UNSPECIFIED TYPE (HCC): Primary | ICD-10-CM

## 2021-08-30 DIAGNOSIS — E78.2 MIXED HYPERLIPIDEMIA: ICD-10-CM

## 2021-08-30 DIAGNOSIS — I10 ESSENTIAL HYPERTENSION: ICD-10-CM

## 2021-08-30 PROCEDURE — 99214 OFFICE O/P EST MOD 30 MIN: CPT | Performed by: NURSE PRACTITIONER

## 2021-08-30 PROCEDURE — 93000 ELECTROCARDIOGRAM COMPLETE: CPT | Performed by: NURSE PRACTITIONER

## 2021-08-30 NOTE — PROGRESS NOTES
Date of Office Visit: 2021  Encounter Provider: DIAMOND Faulkner  Place of Service: [facility]  Patient Name: Jose Manuel Wray  :1950  Primary Cardiologist: Dr. Wheeler    CC:  6 week follow up    Dear Carlota    HPI: Jose Manuel Wray is a pleasant 70 y.o. male who presents 2021 for cardiac follow up.  He is a new patient to me and I reviewed his past medical records.  He is a patient of Dr. Wheeler.  He has a history of hypertension, hyperlipidemia, COPD, hypothyroidism and history of pancreatitis.     He is  and lives alone, is retired , smokes 1ppd cigarettes for 55 years, 2 to 3 cups of coffee per day, 2 cans of Pepsi per day, 2-3 beers daily.  He has a history of alcoholism.  His mother had strokes.     He was recently at your office and was found to have new atrial fibrillation.   He could not feel his heart racing or skipping.  He has chronic dyspnea on exertion which he thinks is due to COPD.   Labs done 2021 show normal CMP, total cholesterol 202, triglycerides 41, HDL 06, LDL 88, normal TSH, normal PSA, normal CBC. He saw Dr. Wheeler and had testing as below.  He was started on Eliquis and metoprolol     2021 echo Interpretation Summary  · Estimated left ventricular EF = 60% Left ventricular systolic function is normal.  · Normal left ventricular cavity size and wall thickness noted. All left ventricular wall segments contract normally.     2021 Stress test Interpretation Summary  · Myocardial perfusion imaging indicates a normal myocardial perfusion study with no evidence of ischemia.  · Left ventricular ejection fraction is hyperdynamic (Calculated EF > 70%). .  · Impressions are consistent with a low risk study.        He is here for follow-up.  He states he has been doing well.  He cannot feel his heart racing or skipping.  He denies any shortness of breath, lower extremity edema, dizziness or lightheadedness.   He is not had any chest pain or chest pressure.  He denies fatigue.  He does continue to smoke.  He is taking his medications as directed.  We did discuss the importance of making sure he takes his Eliquis as this is protecting him against a stroke.  He voiced understanding.    Past Medical History:   Diagnosis Date   • Arthritis    • Emphysema lung (CMS/HCC)    • High cholesterol    • Hypertension    • Pancreatitis    • Shortness of breath    • Thyroid disease        Past Surgical History:   Procedure Laterality Date   • CATARACT EXTRACTION     • HEMORRHOIDECTOMY     • NEPHRECTOMY PARTIAL         Social History     Socioeconomic History   • Marital status: Single     Spouse name: Not on file   • Number of children: Not on file   • Years of education: Not on file   • Highest education level: Not on file   Tobacco Use   • Smoking status: Current Every Day Smoker     Packs/day: 1.50     Types: Cigarettes   • Smokeless tobacco: Never Used   Vaping Use   • Vaping Use: Never assessed   Substance and Sexual Activity   • Alcohol use: Yes     Alcohol/week: 35.0 standard drinks     Types: 35 Cans of beer per week     Comment: 5-6 beer per day   • Drug use: Never   • Sexual activity: Defer       Family History   Problem Relation Age of Onset   • Stroke Mother        The following portion of the patient's history were reviewed and updated as appropriate: past medical history, past surgical history, past social history, past family history, allergies, current medications, and problem list.    Review of Systems   Constitutional: Negative for diaphoresis, fever and malaise/fatigue.   HENT: Negative for congestion, hearing loss, hoarse voice, nosebleeds and sore throat.    Eyes: Negative for photophobia, vision loss in left eye, vision loss in right eye and visual disturbance.   Cardiovascular: Negative for chest pain, dyspnea on exertion, irregular heartbeat, leg swelling, near-syncope, orthopnea, palpitations, paroxysmal  nocturnal dyspnea and syncope.   Respiratory: Negative for cough, hemoptysis, shortness of breath, sleep disturbances due to breathing, snoring, sputum production and wheezing.    Endocrine: Negative for cold intolerance, heat intolerance, polydipsia, polyphagia and polyuria.   Hematologic/Lymphatic: Negative for bleeding problem. Does not bruise/bleed easily.   Skin: Negative for color change, dry skin, poor wound healing, rash and suspicious lesions.   Musculoskeletal: Negative for arthritis, back pain, falls, gout, joint pain, joint swelling, muscle cramps, muscle weakness and myalgias.   Gastrointestinal: Negative for bloating, abdominal pain, constipation, diarrhea, dysphagia, melena, nausea and vomiting.   Neurological: Negative for excessive daytime sleepiness, dizziness, headaches, light-headedness, loss of balance, numbness, paresthesias, seizures, vertigo and weakness.   Psychiatric/Behavioral: Negative for depression, memory loss and substance abuse. The patient is not nervous/anxious.        No Known Allergies      Current Outpatient Medications:   •  albuterol (PROVENTIL HFA;VENTOLIN HFA) 108 (90 BASE) MCG/ACT inhaler, ProAir  (90 Base) MCG/ACT Inhalation Aerosol Solution; Patient Sig: ProAir  (90 Base) MCG/ACT Inhalation Aerosol Solution ; 8; 0; 19-Nov-2014; Active, Disp: , Rfl:   •  amLODIPine (NORVASC) 10 MG tablet, Take by mouth., Disp: , Rfl:   •  apixaban (ELIQUIS) 5 MG tablet tablet, Take 1 tablet by mouth Every 12 (Twelve) Hours., Disp: 60 tablet, Rfl: 11  •  levothyroxine (SYNTHROID, LEVOTHROID) 50 MCG tablet, Take by mouth., Disp: , Rfl:   •  metoprolol tartrate (LOPRESSOR) 25 MG tablet, Take 1 tablet by mouth 2 (Two) Times a Day., Disp: 180 tablet, Rfl: 3  •  omeprazole (PriLOSEC) 40 MG capsule, Take by mouth., Disp: , Rfl:   •  polyethylene glycol (GoLYTELY) 236 G solution, Take by mouth. As directed, Disp: , Rfl:   •  rosuvastatin (CRESTOR) 40 MG tablet, Take by mouth., Disp:  ", Rfl:   •  tiotropium (SPIRIVA) 18 MCG per inhalation capsule, Spiriva HandiHaler 18 MCG Inhalation Capsule; Patient Sig: Spiriva HandiHaler 18 MCG Inhalation Capsule ; 30; 0; 19-Nov-2014; Active, Disp: , Rfl:   •  Trelegy Ellipta 200-62.5-25 MCG/INH inhaler, , Disp: , Rfl:         Objective:     Vitals:    08/30/21 0857   BP: 150/70   Pulse: 65   Resp: 16   SpO2: 95%   Weight: 70.3 kg (155 lb)   Height: 162.6 cm (64\")     Body mass index is 26.61 kg/m².      Vitals reviewed.   Constitutional:       General: Not in acute distress.     Appearance: Healthy appearance. Well-developed and well-groomed.   Eyes:      General:         Right eye: No discharge.         Left eye: No discharge.      Conjunctiva/sclera: Conjunctivae normal.   HENT:      Head: Normocephalic and atraumatic.      Right Ear: External ear normal.      Left Ear: External ear normal.      Nose: Nose normal.   Neck:      Thyroid: No thyromegaly.      Vascular: No JVD.      Trachea: No tracheal deviation.      Lymphadenopathy: No cervical adenopathy.   Pulmonary:      Effort: Pulmonary effort is normal. No respiratory distress.      Breath sounds: Normal breath sounds. No wheezing. No rales.   Chest:      Chest wall: Not tender to palpatation.   Cardiovascular:      Normal rate. Regular rhythm.      No gallop.   Pulses:     Intact distal pulses.   Edema:     Peripheral edema absent.   Abdominal:      General: There is no distension.      Palpations: Abdomen is soft.      Tenderness: There is no abdominal tenderness.   Musculoskeletal: Normal range of motion.         General: No tenderness or deformity.      Cervical back: Normal range of motion and neck supple. Skin:     General: Skin is warm and dry.      Findings: No erythema or rash.   Neurological:      Mental Status: Alert and oriented to person, place, and time.      Coordination: Coordination normal.   Psychiatric:         Attention and Perception: Attention normal.         Mood and Affect: Mood " normal.         Speech: Speech normal.         Behavior: Behavior normal.         Thought Content: Thought content normal.         Cognition and Memory: Cognition normal.         Judgment: Judgment normal.               ECG 12 Lead    Date/Time: 8/30/2021 9:05 AM  Performed by: Fide Michael APRN  Authorized by: Fide Michael APRN   Comparison: compared with previous ECG from 7/9/2021  Rhythm: sinus rhythm  Rate: normal  Conduction: non-specific intraventricular conduction delay  ST Segments: ST segments normal  T elevation: V3, V4, V5 and V6  QRS axis: normal    Clinical impression: abnormal EKG              Assessment:       Diagnosis Plan   1. Atrial fibrillation, unspecified type (CMS/HCC)     2. Essential hypertension     3. Mixed hyperlipidemia            Plan:       1. New onset atrial fibrillation-she is now back in sinus rhythm, rate in the 60s.  He is taking his metoprolol and Eliquis.  2. COPD stable  3. Tobacco use, advised cessation but he says he is been tried for 25 years and cannot.  4. Hypertension-controlled  5. Hyperlipidemia-continue on lipid-lowering medication.  He is currently on rosuvastatin.  6. History of alcoholism  7. History of pancreatitis    RTO in 6 months with RM    As always, it has been a pleasure to participate in your patient's care. Thank you.       Sincerely,       DIAMOND Faulkner      Current Outpatient Medications:   •  albuterol (PROVENTIL HFA;VENTOLIN HFA) 108 (90 BASE) MCG/ACT inhaler, ProAir  (90 Base) MCG/ACT Inhalation Aerosol Solution; Patient Sig: ProAir  (90 Base) MCG/ACT Inhalation Aerosol Solution ; 8; 0; 19-Nov-2014; Active, Disp: , Rfl:   •  amLODIPine (NORVASC) 10 MG tablet, Take by mouth., Disp: , Rfl:   •  apixaban (ELIQUIS) 5 MG tablet tablet, Take 1 tablet by mouth Every 12 (Twelve) Hours., Disp: 60 tablet, Rfl: 11  •  levothyroxine (SYNTHROID, LEVOTHROID) 50 MCG tablet, Take by mouth., Disp: , Rfl:   •  metoprolol tartrate (LOPRESSOR) 25 MG  tablet, Take 1 tablet by mouth 2 (Two) Times a Day., Disp: 180 tablet, Rfl: 3  •  omeprazole (PriLOSEC) 40 MG capsule, Take by mouth., Disp: , Rfl:   •  polyethylene glycol (GoLYTELY) 236 G solution, Take by mouth. As directed, Disp: , Rfl:   •  rosuvastatin (CRESTOR) 40 MG tablet, Take by mouth., Disp: , Rfl:   •  tiotropium (SPIRIVA) 18 MCG per inhalation capsule, Spiriva HandiHaler 18 MCG Inhalation Capsule; Patient Sig: Spiriva HandiHaler 18 MCG Inhalation Capsule ; 30; 0; 19-Nov-2014; Active, Disp: , Rfl:   •  Trelegy Ellipta 200-62.5-25 MCG/INH inhaler, , Disp: , Rfl:     Dictated utilizing Dragon dictation

## 2022-04-27 ENCOUNTER — HOSPITAL ENCOUNTER (EMERGENCY)
Facility: HOSPITAL | Age: 72
Discharge: HOME | End: 2022-04-28
Attending: EMERGENCY MEDICINE | Admitting: EMERGENCY MEDICINE

## 2022-04-27 ENCOUNTER — APPOINTMENT (OUTPATIENT)
Dept: CT IMAGING | Facility: HOSPITAL | Age: 72
End: 2022-04-27

## 2022-04-27 DIAGNOSIS — K85.20 ALCOHOL-INDUCED ACUTE PANCREATITIS WITHOUT INFECTION OR NECROSIS: ICD-10-CM

## 2022-04-27 DIAGNOSIS — R10.30 LOWER ABDOMINAL PAIN: Primary | ICD-10-CM

## 2022-04-27 LAB
ALBUMIN SERPL-MCNC: 4.6 G/DL (ref 3.5–5.2)
ALBUMIN/GLOB SERPL: 1.5 G/DL
ALP SERPL-CCNC: 62 U/L (ref 39–117)
ALT SERPL W P-5'-P-CCNC: 23 U/L (ref 1–41)
AMYLASE SERPL-CCNC: 107 U/L (ref 28–100)
ANION GAP SERPL CALCULATED.3IONS-SCNC: 11.2 MMOL/L (ref 5–15)
APTT PPP: 36.3 SECONDS (ref 24.3–38.1)
AST SERPL-CCNC: 22 U/L (ref 1–40)
BACTERIA UR QL AUTO: ABNORMAL /HPF
BASOPHILS # BLD AUTO: 0.02 10*3/MM3 (ref 0–0.2)
BASOPHILS NFR BLD AUTO: 0.2 % (ref 0–1.5)
BILIRUB SERPL-MCNC: 0.6 MG/DL (ref 0–1.2)
BILIRUB UR QL STRIP: NEGATIVE
BUN SERPL-MCNC: 9 MG/DL (ref 8–23)
BUN/CREAT SERPL: 12 (ref 7–25)
CALCIUM SPEC-SCNC: 9.7 MG/DL (ref 8.6–10.5)
CHLORIDE SERPL-SCNC: 89 MMOL/L (ref 98–107)
CLARITY UR: CLEAR
CO2 SERPL-SCNC: 21.8 MMOL/L (ref 22–29)
COLOR UR: YELLOW
CREAT SERPL-MCNC: 0.75 MG/DL (ref 0.76–1.27)
DEPRECATED RDW RBC AUTO: 45.1 FL (ref 37–54)
EGFRCR SERPLBLD CKD-EPI 2021: 96.5 ML/MIN/1.73
EOSINOPHIL # BLD AUTO: 0.07 10*3/MM3 (ref 0–0.4)
EOSINOPHIL NFR BLD AUTO: 0.5 % (ref 0.3–6.2)
ERYTHROCYTE [DISTWIDTH] IN BLOOD BY AUTOMATED COUNT: 13.2 % (ref 12.3–15.4)
GLOBULIN UR ELPH-MCNC: 3.1 GM/DL
GLUCOSE SERPL-MCNC: 98 MG/DL (ref 65–99)
GLUCOSE UR STRIP-MCNC: NEGATIVE MG/DL
HCT VFR BLD AUTO: 41.7 % (ref 37.5–51)
HGB BLD-MCNC: 14.5 G/DL (ref 13–17.7)
HGB UR QL STRIP.AUTO: ABNORMAL
HYALINE CASTS UR QL AUTO: ABNORMAL /LPF
IMM GRANULOCYTES # BLD AUTO: 0.09 10*3/MM3 (ref 0–0.05)
IMM GRANULOCYTES NFR BLD AUTO: 0.7 % (ref 0–0.5)
INR PPP: 1.07 (ref 0.9–1.1)
KETONES UR QL STRIP: ABNORMAL
LEUKOCYTE ESTERASE UR QL STRIP.AUTO: NEGATIVE
LIPASE SERPL-CCNC: 173 U/L (ref 13–60)
LYMPHOCYTES # BLD AUTO: 1.43 10*3/MM3 (ref 0.7–3.1)
LYMPHOCYTES NFR BLD AUTO: 10.9 % (ref 19.6–45.3)
MCH RBC QN AUTO: 32.5 PG (ref 26.6–33)
MCHC RBC AUTO-ENTMCNC: 34.8 G/DL (ref 31.5–35.7)
MCV RBC AUTO: 93.5 FL (ref 79–97)
MONOCYTES # BLD AUTO: 1.23 10*3/MM3 (ref 0.1–0.9)
MONOCYTES NFR BLD AUTO: 9.4 % (ref 5–12)
MUCOUS THREADS URNS QL MICRO: ABNORMAL /HPF
NEUTROPHILS NFR BLD AUTO: 10.24 10*3/MM3 (ref 1.7–7)
NEUTROPHILS NFR BLD AUTO: 78.3 % (ref 42.7–76)
NITRITE UR QL STRIP: NEGATIVE
NRBC BLD AUTO-RTO: 0 /100 WBC (ref 0–0.2)
PH UR STRIP.AUTO: 6.5 [PH] (ref 4.5–8)
PLATELET # BLD AUTO: 281 10*3/MM3 (ref 140–450)
PMV BLD AUTO: 9.7 FL (ref 6–12)
POTASSIUM SERPL-SCNC: 3.9 MMOL/L (ref 3.5–5.2)
PROT SERPL-MCNC: 7.7 G/DL (ref 6–8.5)
PROT UR QL STRIP: ABNORMAL
PROTHROMBIN TIME: 14.1 SECONDS (ref 12.1–15)
RBC # BLD AUTO: 4.46 10*6/MM3 (ref 4.14–5.8)
RBC # UR STRIP: ABNORMAL /HPF
REF LAB TEST METHOD: ABNORMAL
SODIUM SERPL-SCNC: 122 MMOL/L (ref 136–145)
SP GR UR STRIP: 1.02 (ref 1–1.03)
SQUAMOUS #/AREA URNS HPF: ABNORMAL /HPF
UROBILINOGEN UR QL STRIP: ABNORMAL
WBC # UR STRIP: ABNORMAL /HPF
WBC NRBC COR # BLD: 13.08 10*3/MM3 (ref 3.4–10.8)

## 2022-04-27 PROCEDURE — 99284 EMERGENCY DEPT VISIT MOD MDM: CPT | Performed by: EMERGENCY MEDICINE

## 2022-04-27 PROCEDURE — 83690 ASSAY OF LIPASE: CPT | Performed by: EMERGENCY MEDICINE

## 2022-04-27 PROCEDURE — 81001 URINALYSIS AUTO W/SCOPE: CPT | Performed by: EMERGENCY MEDICINE

## 2022-04-27 PROCEDURE — 36415 COLL VENOUS BLD VENIPUNCTURE: CPT

## 2022-04-27 PROCEDURE — 85025 COMPLETE CBC W/AUTO DIFF WBC: CPT | Performed by: EMERGENCY MEDICINE

## 2022-04-27 PROCEDURE — 82077 ASSAY SPEC XCP UR&BREATH IA: CPT | Performed by: EMERGENCY MEDICINE

## 2022-04-27 PROCEDURE — 96374 THER/PROPH/DIAG INJ IV PUSH: CPT

## 2022-04-27 PROCEDURE — 82150 ASSAY OF AMYLASE: CPT | Performed by: EMERGENCY MEDICINE

## 2022-04-27 PROCEDURE — 85730 THROMBOPLASTIN TIME PARTIAL: CPT | Performed by: EMERGENCY MEDICINE

## 2022-04-27 PROCEDURE — 25010000002 FENTANYL CITRATE (PF) 50 MCG/ML SOLUTION: Performed by: EMERGENCY MEDICINE

## 2022-04-27 PROCEDURE — 99283 EMERGENCY DEPT VISIT LOW MDM: CPT

## 2022-04-27 PROCEDURE — 96361 HYDRATE IV INFUSION ADD-ON: CPT

## 2022-04-27 PROCEDURE — 85610 PROTHROMBIN TIME: CPT | Performed by: EMERGENCY MEDICINE

## 2022-04-27 PROCEDURE — 0 IOPAMIDOL PER 1 ML: Performed by: EMERGENCY MEDICINE

## 2022-04-27 PROCEDURE — 80053 COMPREHEN METABOLIC PANEL: CPT | Performed by: EMERGENCY MEDICINE

## 2022-04-27 PROCEDURE — 74177 CT ABD & PELVIS W/CONTRAST: CPT

## 2022-04-27 RX ORDER — SODIUM CHLORIDE 9 MG/ML
250 INJECTION, SOLUTION INTRAVENOUS CONTINUOUS
Status: DISCONTINUED | OUTPATIENT
Start: 2022-04-27 | End: 2022-04-28 | Stop reason: HOSPADM

## 2022-04-27 RX ORDER — PRAVASTATIN SODIUM 40 MG
40 TABLET ORAL DAILY
COMMUNITY

## 2022-04-27 RX ORDER — FENTANYL CITRATE 50 UG/ML
50 INJECTION, SOLUTION INTRAMUSCULAR; INTRAVENOUS ONCE
Status: COMPLETED | OUTPATIENT
Start: 2022-04-27 | End: 2022-04-27

## 2022-04-27 RX ORDER — SODIUM BICARBONATE 325 MG/1
325 TABLET ORAL 4 TIMES DAILY
COMMUNITY

## 2022-04-27 RX ORDER — LISINOPRIL 20 MG/1
10 TABLET ORAL DAILY
COMMUNITY

## 2022-04-27 RX ORDER — SODIUM CHLORIDE 0.9 % (FLUSH) 0.9 %
10 SYRINGE (ML) INJECTION AS NEEDED
Status: DISCONTINUED | OUTPATIENT
Start: 2022-04-27 | End: 2022-04-28 | Stop reason: HOSPADM

## 2022-04-27 RX ADMIN — FENTANYL CITRATE 50 MCG: 50 INJECTION, SOLUTION INTRAMUSCULAR; INTRAVENOUS at 23:02

## 2022-04-27 RX ADMIN — IOPAMIDOL 100 ML: 755 INJECTION, SOLUTION INTRAVENOUS at 23:37

## 2022-04-27 RX ADMIN — SODIUM CHLORIDE 250 ML/HR: 9 INJECTION, SOLUTION INTRAVENOUS at 22:48

## 2022-04-28 VITALS
DIASTOLIC BLOOD PRESSURE: 111 MMHG | TEMPERATURE: 97.9 F | BODY MASS INDEX: 25.61 KG/M2 | HEART RATE: 125 BPM | RESPIRATION RATE: 19 BRPM | OXYGEN SATURATION: 89 % | SYSTOLIC BLOOD PRESSURE: 172 MMHG | HEIGHT: 64 IN | WEIGHT: 150 LBS

## 2022-04-28 LAB
ETHANOL BLD-MCNC: <10 MG/DL (ref 0–10)
ETHANOL UR QL: <0.01 %

## 2022-04-28 PROCEDURE — 96361 HYDRATE IV INFUSION ADD-ON: CPT

## 2022-04-28 RX ORDER — HYDROCODONE BITARTRATE AND ACETAMINOPHEN 5; 325 MG/1; MG/1
2 TABLET ORAL ONCE
Status: COMPLETED | OUTPATIENT
Start: 2022-04-28 | End: 2022-04-28

## 2022-04-28 RX ORDER — HYDROCODONE BITARTRATE AND ACETAMINOPHEN 5; 325 MG/1; MG/1
1-2 TABLET ORAL EVERY 6 HOURS PRN
Qty: 20 TABLET | Refills: 0 | Status: SHIPPED | OUTPATIENT
Start: 2022-04-28

## 2022-04-28 RX ADMIN — HYDROCODONE BITARTRATE AND ACETAMINOPHEN 2 TABLET: 5; 325 TABLET ORAL at 00:35

## 2022-04-29 ENCOUNTER — TRANSCRIBE ORDERS (OUTPATIENT)
Dept: ADMINISTRATIVE | Facility: HOSPITAL | Age: 72
End: 2022-04-29

## 2022-04-29 DIAGNOSIS — F17.200 SMOKER: Primary | ICD-10-CM

## 2022-05-05 ENCOUNTER — HOSPITAL ENCOUNTER (OUTPATIENT)
Dept: CT IMAGING | Facility: HOSPITAL | Age: 72
Discharge: HOME | End: 2022-05-05
Admitting: NURSE PRACTITIONER

## 2022-05-05 DIAGNOSIS — F17.200 SMOKER: ICD-10-CM

## 2022-05-05 PROCEDURE — 71271 CT THORAX LUNG CANCER SCR C-: CPT

## 2022-11-04 ENCOUNTER — APPOINTMENT (OUTPATIENT)
Dept: MRI IMAGING | Facility: HOSPITAL | Age: 72
End: 2022-11-04

## 2022-11-04 ENCOUNTER — HOSPITAL ENCOUNTER (EMERGENCY)
Facility: HOSPITAL | Age: 72
Discharge: NURSING, REHAB OR LONG-TERM CARE | End: 2022-11-04
Attending: EMERGENCY MEDICINE | Admitting: EMERGENCY MEDICINE

## 2022-11-04 ENCOUNTER — APPOINTMENT (OUTPATIENT)
Dept: GENERAL RADIOLOGY | Facility: HOSPITAL | Age: 72
End: 2022-11-04

## 2022-11-04 ENCOUNTER — APPOINTMENT (OUTPATIENT)
Dept: CT IMAGING | Facility: HOSPITAL | Age: 72
End: 2022-11-04

## 2022-11-04 VITALS
BODY MASS INDEX: 27.83 KG/M2 | DIASTOLIC BLOOD PRESSURE: 81 MMHG | HEIGHT: 64 IN | RESPIRATION RATE: 19 BRPM | HEART RATE: 66 BPM | OXYGEN SATURATION: 95 % | TEMPERATURE: 97.3 F | SYSTOLIC BLOOD PRESSURE: 175 MMHG | WEIGHT: 163 LBS

## 2022-11-04 DIAGNOSIS — R45.1 AGITATION: ICD-10-CM

## 2022-11-04 DIAGNOSIS — R41.82 ALTERED MENTAL STATUS, UNSPECIFIED ALTERED MENTAL STATUS TYPE: Primary | ICD-10-CM

## 2022-11-04 DIAGNOSIS — D72.829 LEUKOCYTOSIS, UNSPECIFIED TYPE: ICD-10-CM

## 2022-11-04 LAB
ALBUMIN SERPL-MCNC: 3.5 G/DL (ref 3.5–5.2)
ALBUMIN/GLOB SERPL: 1.3 G/DL
ALP SERPL-CCNC: 69 U/L (ref 39–117)
ALT SERPL W P-5'-P-CCNC: 22 U/L (ref 1–41)
AMMONIA BLD-SCNC: 26 UMOL/L (ref 16–60)
ANION GAP SERPL CALCULATED.3IONS-SCNC: 11 MMOL/L (ref 5–15)
APTT PPP: 29.3 SECONDS (ref 24.3–38.1)
AST SERPL-CCNC: 16 U/L (ref 1–40)
BASOPHILS # BLD AUTO: 0.02 10*3/MM3 (ref 0–0.2)
BASOPHILS NFR BLD AUTO: 0.1 % (ref 0–1.5)
BILIRUB SERPL-MCNC: 0.2 MG/DL (ref 0–1.2)
BILIRUB UR QL STRIP: NEGATIVE
BUN SERPL-MCNC: 22 MG/DL (ref 8–23)
BUN/CREAT SERPL: 23.4 (ref 7–25)
CALCIUM SPEC-SCNC: 8.6 MG/DL (ref 8.6–10.5)
CHLORIDE SERPL-SCNC: 107 MMOL/L (ref 98–107)
CLARITY UR: CLEAR
CO2 SERPL-SCNC: 23 MMOL/L (ref 22–29)
COLOR UR: YELLOW
CREAT SERPL-MCNC: 0.94 MG/DL (ref 0.76–1.27)
DEPRECATED RDW RBC AUTO: 46.7 FL (ref 37–54)
EGFRCR SERPLBLD CKD-EPI 2021: 86.1 ML/MIN/1.73
EOSINOPHIL # BLD AUTO: 0.2 10*3/MM3 (ref 0–0.4)
EOSINOPHIL NFR BLD AUTO: 1.3 % (ref 0.3–6.2)
ERYTHROCYTE [DISTWIDTH] IN BLOOD BY AUTOMATED COUNT: 13.9 % (ref 12.3–15.4)
GLOBULIN UR ELPH-MCNC: 2.8 GM/DL
GLUCOSE SERPL-MCNC: 116 MG/DL (ref 65–99)
GLUCOSE UR STRIP-MCNC: NEGATIVE MG/DL
HCT VFR BLD AUTO: 37.3 % (ref 37.5–51)
HGB BLD-MCNC: 12.3 G/DL (ref 13–17.7)
HGB UR QL STRIP.AUTO: NEGATIVE
IMM GRANULOCYTES # BLD AUTO: 1.33 10*3/MM3 (ref 0–0.05)
IMM GRANULOCYTES NFR BLD AUTO: 8.4 % (ref 0–0.5)
INR PPP: 1.09 (ref 0.9–1.1)
KETONES UR QL STRIP: NEGATIVE
LEUKOCYTE ESTERASE UR QL STRIP.AUTO: NEGATIVE
LYMPHOCYTES # BLD AUTO: 2.73 10*3/MM3 (ref 0.7–3.1)
LYMPHOCYTES NFR BLD AUTO: 17.2 % (ref 19.6–45.3)
MCH RBC QN AUTO: 29.6 PG (ref 26.6–33)
MCHC RBC AUTO-ENTMCNC: 33 G/DL (ref 31.5–35.7)
MCV RBC AUTO: 89.9 FL (ref 79–97)
MONOCYTES # BLD AUTO: 1.18 10*3/MM3 (ref 0.1–0.9)
MONOCYTES NFR BLD AUTO: 7.4 % (ref 5–12)
NEUTROPHILS NFR BLD AUTO: 10.45 10*3/MM3 (ref 1.7–7)
NEUTROPHILS NFR BLD AUTO: 65.6 % (ref 42.7–76)
NITRITE UR QL STRIP: NEGATIVE
PH UR STRIP.AUTO: 5.5 [PH] (ref 4.5–8)
PLATELET # BLD AUTO: 376 10*3/MM3 (ref 140–450)
PMV BLD AUTO: 10.7 FL (ref 6–12)
POTASSIUM SERPL-SCNC: 3.6 MMOL/L (ref 3.5–5.2)
PROCALCITONIN SERPL-MCNC: <0.02 NG/ML (ref 0–0.25)
PROT SERPL-MCNC: 6.3 G/DL (ref 6–8.5)
PROT UR QL STRIP: NEGATIVE
PROTHROMBIN TIME: 14.2 SECONDS (ref 12.1–15)
RBC # BLD AUTO: 4.15 10*6/MM3 (ref 4.14–5.8)
RBC MORPH BLD: NORMAL
SMALL PLATELETS BLD QL SMEAR: ADEQUATE
SODIUM SERPL-SCNC: 141 MMOL/L (ref 136–145)
SP GR UR STRIP: 1.02 (ref 1–1.03)
TROPONIN T SERPL-MCNC: <0.01 NG/ML (ref 0–0.03)
UROBILINOGEN UR QL STRIP: NORMAL
WBC MORPH BLD: NORMAL
WBC NRBC COR # BLD: 15.91 10*3/MM3 (ref 3.4–10.8)

## 2022-11-04 PROCEDURE — 85007 BL SMEAR W/DIFF WBC COUNT: CPT | Performed by: EMERGENCY MEDICINE

## 2022-11-04 PROCEDURE — 70551 MRI BRAIN STEM W/O DYE: CPT

## 2022-11-04 PROCEDURE — 36415 COLL VENOUS BLD VENIPUNCTURE: CPT

## 2022-11-04 PROCEDURE — 99284 EMERGENCY DEPT VISIT MOD MDM: CPT

## 2022-11-04 PROCEDURE — 85610 PROTHROMBIN TIME: CPT | Performed by: EMERGENCY MEDICINE

## 2022-11-04 PROCEDURE — 71046 X-RAY EXAM CHEST 2 VIEWS: CPT

## 2022-11-04 PROCEDURE — 81003 URINALYSIS AUTO W/O SCOPE: CPT | Performed by: EMERGENCY MEDICINE

## 2022-11-04 PROCEDURE — 84484 ASSAY OF TROPONIN QUANT: CPT | Performed by: EMERGENCY MEDICINE

## 2022-11-04 PROCEDURE — 85025 COMPLETE CBC W/AUTO DIFF WBC: CPT | Performed by: EMERGENCY MEDICINE

## 2022-11-04 PROCEDURE — 93010 ELECTROCARDIOGRAM REPORT: CPT | Performed by: INTERNAL MEDICINE

## 2022-11-04 PROCEDURE — 93005 ELECTROCARDIOGRAM TRACING: CPT | Performed by: EMERGENCY MEDICINE

## 2022-11-04 PROCEDURE — 82140 ASSAY OF AMMONIA: CPT | Performed by: EMERGENCY MEDICINE

## 2022-11-04 PROCEDURE — 70450 CT HEAD/BRAIN W/O DYE: CPT

## 2022-11-04 PROCEDURE — 80053 COMPREHEN METABOLIC PANEL: CPT | Performed by: EMERGENCY MEDICINE

## 2022-11-04 PROCEDURE — 84145 PROCALCITONIN (PCT): CPT | Performed by: EMERGENCY MEDICINE

## 2022-11-04 PROCEDURE — 85730 THROMBOPLASTIN TIME PARTIAL: CPT | Performed by: EMERGENCY MEDICINE

## 2022-11-04 RX ORDER — PREDNISONE 20 MG/1
20 TABLET ORAL DAILY
COMMUNITY

## 2022-11-04 RX ORDER — FOLIC ACID 1 MG/1
1 TABLET ORAL DAILY
COMMUNITY

## 2022-11-04 RX ORDER — FUROSEMIDE 40 MG/1
40 TABLET ORAL DAILY
COMMUNITY

## 2022-11-04 RX ORDER — SODIUM CHLORIDE 0.9 % (FLUSH) 0.9 %
10 SYRINGE (ML) INJECTION AS NEEDED
Status: DISCONTINUED | OUTPATIENT
Start: 2022-11-04 | End: 2022-11-04 | Stop reason: HOSPADM

## 2022-11-04 RX ORDER — ATORVASTATIN CALCIUM 40 MG/1
40 TABLET, FILM COATED ORAL DAILY
COMMUNITY

## 2022-11-04 NOTE — ED TRIAGE NOTES
"Pt via [facility] escorted by son-in-law with c/o \"not acting himself\" (\"irritable, anxious, behavorial changes\"; the NH also did labs and had a high white count\")    Per family hx of stroke (expressive aphasia)    All triage performed with this RN wearing appropriate PPE.  Pt placed in mask upon arrival to ED.    "

## 2022-11-04 NOTE — DISCHARGE INSTRUCTIONS
Recommend withholding steroid medications.  Please follow-up with PCP promptly for repeat evaluation.  Please return to the emergency room for any worsening symptoms or concerns.

## 2022-11-04 NOTE — ED PROVIDER NOTES
EMERGENCY DEPARTMENT ENCOUNTER    Room Number:  JAEL/JAEL  Date seen:  11/4/2022  PCP: Debbie Bello APRN  Historian: Patient and his son in law    HPI:  Chief Complaint: Altered mental status  A complete HPI/ROS/PMH/PSH/SH/FH are unobtainable due to: Altered mentation  Context: Jose Manuel Wray is a 72 y.o. male who presents to the ED for evaluation because of change in behavior and mentation.  History is somewhat limited because the patient had a stroke over the summer that resulted in some expressive aphasia.  His son-in-law accompanies him here for this visit.  The patient is currently residing in the [facility].  His son-in-law tells me that for the past couple of days the patient has had some increased anxious and agitated behaviors.  They had done some outpatient labs about 1 week ago and noticed a leukocytosis.  He had been prescribed doxycycline for pneumonia which he has almost completed the course of those antibiotics.  The patient himself has no specific complaints at this time.      PAST MEDICAL HISTORY  Active Ambulatory Problems     Diagnosis Date Noted   • Chronic pancreatitis (HCC) 03/14/2016   • Alcohol intoxication (HCC) 12/20/2019   • Fall 12/20/2019   • Hyponatremia 12/20/2019   • Laceration of scalp without foreign body 12/20/2019   • Wheezing 12/21/2019   • Atrial fibrillation (HCC) 07/09/2021   • Mixed hyperlipidemia 07/09/2021   • Essential hypertension 07/09/2021   • Other emphysema (HCC) 07/09/2021     Resolved Ambulatory Problems     Diagnosis Date Noted   • No Resolved Ambulatory Problems     Past Medical History:   Diagnosis Date   • Arthritis    • Emphysema lung (HCC)    • High cholesterol    • Hypertension    • Pancreatitis    • Shortness of breath    • Thyroid disease          PAST SURGICAL HISTORY  Past Surgical History:   Procedure Laterality Date   • CATARACT EXTRACTION     • HEMORRHOIDECTOMY     • NEPHRECTOMY PARTIAL           FAMILY HISTORY  Family History   Problem  Relation Age of Onset   • Stroke Mother          SOCIAL HISTORY  Social History     Socioeconomic History   • Marital status: Single   Tobacco Use   • Smoking status: Every Day     Packs/day: 1.00     Types: Cigarettes   • Smokeless tobacco: Never   Substance and Sexual Activity   • Alcohol use: Yes     Alcohol/week: 35.0 standard drinks     Types: 35 Cans of beer per week     Comment: 5-6 beer per day   • Drug use: Never   • Sexual activity: Defer         ALLERGIES  Patient has no known allergies.        REVIEW OF SYSTEMS  Review of Systems   Unable to perform ROS: Mental status change   Psychiatric/Behavioral: Positive for behavioral problems and confusion.   All other systems reviewed and are negative.      PHYSICAL EXAM  ED Triage Vitals [11/04/22 1009]   Temp Heart Rate Resp BP SpO2   97.3 °F (36.3 °C) 61 18 173/71 97 %      Temp src Heart Rate Source Patient Position BP Location FiO2 (%)   Oral -- -- -- --         GENERAL: Calm, elderly gentleman, resting in the bed, no acute distress  HENT: nares patent, normocephalic and atraumatic, moist mucous membranes  EYES: no scleral icterus, left-sided medial esotropia noted (reportedly chronic per her son-in-law), PERRL, normal conjunctiva  CV: regular rhythm, bradycardic heart rate in the low 50s.  Normal distal pulses noted  RESPIRATORY: normal effort, no stridor.  Breath sounds are diminished bilaterally at the bases.  No rhonchi or rales audible.  ABDOMEN: soft, nontender throughout all quadrants.  No peritoneal features.  MUSCULOSKELETAL: no deformity no edema or asymmetry  NEURO: alert, moves all extremities, follows commands, no facial droop.  Normal tongue protrusion.  No pronator drift exhibited.  No obvious focal motor deficits detected on exam.  Speech is somewhat impaired as is consistent with his known stroke history.  PSYCH:  calm, cooperative at current time  SKIN: warm, dry    Vital signs and nursing notes reviewed.          LAB RESULTS  Recent  Results (from the past 24 hour(s))   Comprehensive Metabolic Panel    Collection Time: 11/04/22 10:32 AM    Specimen: Blood   Result Value Ref Range    Glucose 116 (H) 65 - 99 mg/dL    BUN 22 8 - 23 mg/dL    Creatinine 0.94 0.76 - 1.27 mg/dL    Sodium 141 136 - 145 mmol/L    Potassium 3.6 3.5 - 5.2 mmol/L    Chloride 107 98 - 107 mmol/L    CO2 23.0 22.0 - 29.0 mmol/L    Calcium 8.6 8.6 - 10.5 mg/dL    Total Protein 6.3 6.0 - 8.5 g/dL    Albumin 3.50 3.50 - 5.20 g/dL    ALT (SGPT) 22 1 - 41 U/L    AST (SGOT) 16 1 - 40 U/L    Alkaline Phosphatase 69 39 - 117 U/L    Total Bilirubin 0.2 0.0 - 1.2 mg/dL    Globulin 2.8 gm/dL    A/G Ratio 1.3 g/dL    BUN/Creatinine Ratio 23.4 7.0 - 25.0    Anion Gap 11.0 5.0 - 15.0 mmol/L    eGFR 86.1 >60.0 mL/min/1.73   Troponin    Collection Time: 11/04/22 10:32 AM    Specimen: Blood   Result Value Ref Range    Troponin T <0.010 0.000 - 0.030 ng/mL   Protime-INR    Collection Time: 11/04/22 10:32 AM    Specimen: Blood   Result Value Ref Range    Protime 14.2 12.1 - 15.0 Seconds    INR 1.09 0.90 - 1.10   aPTT    Collection Time: 11/04/22 10:32 AM    Specimen: Blood   Result Value Ref Range    PTT 29.3 24.3 - 38.1 seconds   Procalcitonin    Collection Time: 11/04/22 10:32 AM    Specimen: Blood   Result Value Ref Range    Procalcitonin <0.02 0.00 - 0.25 ng/mL   CBC Auto Differential    Collection Time: 11/04/22 10:32 AM    Specimen: Blood   Result Value Ref Range    WBC 15.91 (H) 3.40 - 10.80 10*3/mm3    RBC 4.15 4.14 - 5.80 10*6/mm3    Hemoglobin 12.3 (L) 13.0 - 17.7 g/dL    Hematocrit 37.3 (L) 37.5 - 51.0 %    MCV 89.9 79.0 - 97.0 fL    MCH 29.6 26.6 - 33.0 pg    MCHC 33.0 31.5 - 35.7 g/dL    RDW 13.9 12.3 - 15.4 %    RDW-SD 46.7 37.0 - 54.0 fl    MPV 10.7 6.0 - 12.0 fL    Platelets 376 140 - 450 10*3/mm3    Neutrophil % 65.6 42.7 - 76.0 %    Lymphocyte % 17.2 (L) 19.6 - 45.3 %    Monocyte % 7.4 5.0 - 12.0 %    Eosinophil % 1.3 0.3 - 6.2 %    Basophil % 0.1 0.0 - 1.5 %    Immature Grans  % 8.4 (H) 0.0 - 0.5 %    Neutrophils, Absolute 10.45 (H) 1.70 - 7.00 10*3/mm3    Lymphocytes, Absolute 2.73 0.70 - 3.10 10*3/mm3    Monocytes, Absolute 1.18 (H) 0.10 - 0.90 10*3/mm3    Eosinophils, Absolute 0.20 0.00 - 0.40 10*3/mm3    Basophils, Absolute 0.02 0.00 - 0.20 10*3/mm3    Immature Grans, Absolute 1.33 (H) 0.00 - 0.05 10*3/mm3   Scan Slide    Collection Time: 11/04/22 10:32 AM    Specimen: Blood   Result Value Ref Range    RBC Morphology Normal Normal    WBC Morphology Normal Normal    Platelet Estimate Adequate Normal   ECG 12 Lead Altered Mental Status    Collection Time: 11/04/22 10:36 AM   Result Value Ref Range    QT Interval 429 ms   Urinalysis With Culture If Indicated - Urine, Clean Catch    Collection Time: 11/04/22 11:33 AM    Specimen: Urine, Clean Catch   Result Value Ref Range    Color, UA Yellow Yellow, Straw    Appearance, UA Clear Clear    pH, UA 5.5 4.5 - 8.0    Specific Gravity, UA 1.020 1.003 - 1.030    Glucose, UA Negative Negative    Ketones, UA Negative Negative    Bilirubin, UA Negative Negative    Blood, UA Negative Negative    Protein, UA Negative Negative    Leuk Esterase, UA Negative Negative    Nitrite, UA Negative Negative    Urobilinogen, UA 0.2 E.U./dL 0.2 - 1.0 E.U./dL   Ammonia    Collection Time: 11/04/22 12:47 PM    Specimen: Blood   Result Value Ref Range    Ammonia 26 16 - 60 umol/L       Ordered the above labs and reviewed the results.        RADIOLOGY  XR Chest 2 View    Result Date: 11/4/2022  CR Chest 2 Vws INDICATION:  Altered mental status COMPARISON:  None. FINDINGS: PA and lateral views of the chest.  The heart is mildly enlarged. No abnormal mediastinal widening is seen. The lungs are clear. Vascular markings are normal. No effusions are seen.      Mild cardiomegaly. Signer Name: Wayne Brown MD  Signed: 11/4/2022 11:37 AM  Workstation Name: [id number]  [facility]    CT Head Without Contrast    Result Date: 11/4/2022  HISTORY: Altered  mental status. Symptoms for 2 days. History of stroke in July. TECHNIQUE: CT head without contrast. Radiation dose reduction techniques included automated exposure control or exposure modulation based on body size. Radiation audit for number of CT and nuclear cardiology exams performed in the last year: 0.  COMPARISON: I do not have access to prior imaging of the brain. Direct comparison to outside imaging obtained at time of recent stroke recommended. FINDINGS: There is small amount fluid or inflammatory change in the left mastoid tip. There is mucosal disease in the right maxillary sinus and ethmoid air cells without air-fluid level in the visualized paranasal sinuses. Tiny air-fluid level in the right mastoid tip. Prominent vascular calcifications at the base the brain. No calvarial fracture. There is been cataract surgery on the right. There is encephalomalacia in the left middle cerebral artery territory including the left parietal lobe and underlying white matter posterolaterally and left lateral frontal lobe. This includes involvement of the anterior left insula. This could represent recent infarct described in the history, but I cannot exclude extension of infarction without comparison films. There is no evidence for acute intracranial hemorrhage or extra-axial fluid collection. There is no intracranial mass effect. There is mild generalized atrophy. There is no hydrocephalus. The basilar cisterns are patent. There is a chronic lacune in the right thalamus and a giant perivascular space and right inferior basal ganglia. Likely also small lacuna.     1. Encephalomalacia in the left MCA territory is probably due to the previously described infarct. Without comparison films I cannot exclude interval extension. If there is clinical concern for acute CVA, recommend MRI correlation if the patient is a candidate. 2. No acute intracranial hemorrhage or intracranial mass effect. 3. Probable sequelae of small vessel  disease and generalized atrophy. Signer Name: Meena Mitchell MD  Signed: 11/4/2022 11:15 AM  Workstation Name: [id number]  [facility]    MRI Brain Without Contrast    Result Date: 11/4/2022  MRI Brain WO INDICATION: Agitations, anxiety, the last couple days, unsteady on feet, expressive aphasia TECHNIQUE: MRI of the brain without IV contrast. COMPARISON:  MRI brain 6/8/2022, CT head 11/4/2022 FINDINGS: Old left MCA territory infarct. There is no superimposed acute or early subacute infarct. There are scattered T2/FLAIR signal hyperintensities within the bilateral cerebral and deep white matter which are nonspecific but most commonly associated with chronic microvascular ischemic change. Old right thalamic lacunar infarct. Moderate global volume loss. Midline structures are within normal limits. The major intracranial flow voids are maintained. Moderate mastoid effusions. Mild mucosal thickening scattered in the paranasal sinuses. Right globe lens replacement.. Calvarial marrow signal is within normal limits.     1.  Old left MCA territory infarct. There is no superimposed acute or early subacute infarct. 2.  Chronic microvascular ischemic change. 3.  Moderate global volume loss. Signer Name: ART ADAMSON MD  Signed: 11/4/2022 1:32 PM  Workstation Name: [id number]  [facility]      Ordered the above noted radiological studies. Reviewed by me in PACS.            PROCEDURES  Procedures    EKG           EKG time/Interp time: 1036/1038  Rhythm/Rate: Bradycardic atrial fibrillation, 48 bpm  P waves and IN: None  QRS, axis: 110 ms, normal axis  ST and T waves: No ST segment elevation patterns evident    Independently interpreted by me contemporaneously with treatment        MEDICATIONS GIVEN IN ER  Medications   sodium chloride 0.9 % flush 10 mL (has no administration in time range)                   MEDICAL DECISION MAKING, PROGRESS, and CONSULTS    All labs have been independently  reviewed by me.  All radiology studies have been reviewed by me and discussed with radiologist dictating the report.   EKG's independently viewed and interpreted by me.  Discussion below represents my analysis of pertinent findings related to patient's condition, differential diagnosis, treatment plan and final disposition.      My differential diagnosis for altered mental status includes but is not limited to:  Hypoglycemia, hyperglycemia, DKA, overdose, ethanol intoxication, thiamine deficiency, niacin deficiency, hypothymia, hyperviscosity, Dimitry’s disease, hyponatremia, hypernatremia, liver failure, kidney failure, hyper or hypothyroid, no insufficiency, hypoxia, hypercarbia, carbon monoxide poisoning, postanoxic encephalopathy, ischemic stroke, intracranial bleed, subarachnoid hemorrhage, brain tumor, closed head injury, epidural hematoma, epidural hematoma, seizure activity, postictal state, syncopal episode, disseminated encephalomyelitis, central pontine myelinolysis, post cardiac arrest, bacterial meningitis, viral meningitis, fungal meningitis, encephalitis, brain abscess, subdural empyema, hysteria, catatonic state, malingering, hypertensive encephalopathy, vasculitis, TTP, DIC      ED Course as of 11/04/22 1510   Fri Nov 04, 2022   1404 I have reviewed the EKG, labs, and radiology report reports from today's visit.  Multiple studies have been completed now.  Patient has a leukocytosis of uncertain significance.  He was taking some steroid medications last week in addition to the antibiotics for presumptive pneumonia.  Perhaps his leukocytosis is due to recent steroid use.  Additionally, perhaps his mental status and agitation are due to steroid psychosis.  Apart from that possibility, I cannot find any other acute infectious or neurologic or cardiac issue which would explain his change in behavior over the past several days now.  He clearly does not appear to be any kind of acute stroke issue.  Patient  "is afebrile and nontoxic-appearing.  I think he is safe to discharge back to the [facility] for continued supportive care at this time.  I had a long conversation with the patient's son in law and we reviewed all the test results together.  I encouraged prompt follow-up with PCP who is guiding his care.  I discussed with the son-in-law, Byron, the usual \"return to ER\" instructions for any worsening signs or symptoms.  Then patient was discharged back to [facility] in stable condition. [RICKY]      ED Course User Index  [RICKY] David Treviño MD                DIAGNOSIS  Final diagnoses:   Altered mental status, unspecified altered mental status type   Agitation   Leukocytosis, unspecified type         DISPOSITION  Return to nursing home            Latest Documented Vital Signs:  As of 15:10 EDT  BP- 175/81 HR- 66 Temp- 97.3 °F (36.3 °C) (Oral) O2 sat- 95%        --    Please note that portions of this were completed with a voice recognition program.          David Treviño MD  11/04/22 1510    "

## 2022-11-05 LAB — QT INTERVAL: 429 MS

## 2023-11-28 ENCOUNTER — APPOINTMENT (OUTPATIENT)
Dept: GENERAL RADIOLOGY | Facility: HOSPITAL | Age: 73
End: 2023-11-28
Payer: MEDICARE

## 2023-11-28 ENCOUNTER — HOSPITAL ENCOUNTER (EMERGENCY)
Facility: HOSPITAL | Age: 73
Discharge: NURSING, REHAB OR LONG-TERM CARE | End: 2023-11-28
Attending: EMERGENCY MEDICINE | Admitting: EMERGENCY MEDICINE
Payer: MEDICARE

## 2023-11-28 VITALS
DIASTOLIC BLOOD PRESSURE: 64 MMHG | WEIGHT: 181.8 LBS | OXYGEN SATURATION: 90 % | TEMPERATURE: 97.9 F | HEART RATE: 52 BPM | RESPIRATION RATE: 20 BRPM | SYSTOLIC BLOOD PRESSURE: 155 MMHG | BODY MASS INDEX: 31.21 KG/M2

## 2023-11-28 DIAGNOSIS — R07.89 CHEST TIGHTNESS: Primary | ICD-10-CM

## 2023-11-28 LAB
ALBUMIN SERPL-MCNC: 3.9 G/DL (ref 3.5–5.2)
ALBUMIN/GLOB SERPL: 1.1 G/DL
ALP SERPL-CCNC: 85 U/L (ref 39–117)
ALT SERPL W P-5'-P-CCNC: 10 U/L (ref 1–41)
ANION GAP SERPL CALCULATED.3IONS-SCNC: 11.7 MMOL/L (ref 5–15)
AST SERPL-CCNC: 17 U/L (ref 1–40)
BASOPHILS # BLD AUTO: 0.03 10*3/MM3 (ref 0–0.2)
BASOPHILS NFR BLD AUTO: 0.3 % (ref 0–1.5)
BILIRUB SERPL-MCNC: 0.3 MG/DL (ref 0–1.2)
BUN SERPL-MCNC: 23 MG/DL (ref 8–23)
BUN/CREAT SERPL: 17.8 (ref 7–25)
CALCIUM SPEC-SCNC: 9.1 MG/DL (ref 8.6–10.5)
CHLORIDE SERPL-SCNC: 103 MMOL/L (ref 98–107)
CO2 SERPL-SCNC: 24.3 MMOL/L (ref 22–29)
CREAT SERPL-MCNC: 1.29 MG/DL (ref 0.76–1.27)
DEPRECATED RDW RBC AUTO: 51 FL (ref 37–54)
EGFRCR SERPLBLD CKD-EPI 2021: 58.5 ML/MIN/1.73
EOSINOPHIL # BLD AUTO: 0.25 10*3/MM3 (ref 0–0.4)
EOSINOPHIL NFR BLD AUTO: 2.7 % (ref 0.3–6.2)
ERYTHROCYTE [DISTWIDTH] IN BLOOD BY AUTOMATED COUNT: 14.4 % (ref 12.3–15.4)
GEN 5 2HR TROPONIN T REFLEX: 15 NG/L
GLOBULIN UR ELPH-MCNC: 3.5 GM/DL
GLUCOSE SERPL-MCNC: 96 MG/DL (ref 65–99)
HCT VFR BLD AUTO: 39.1 % (ref 37.5–51)
HGB BLD-MCNC: 11.8 G/DL (ref 13–17.7)
HOLD SPECIMEN: NORMAL
HOLD SPECIMEN: NORMAL
IMM GRANULOCYTES # BLD AUTO: 0.02 10*3/MM3 (ref 0–0.05)
IMM GRANULOCYTES NFR BLD AUTO: 0.2 % (ref 0–0.5)
LYMPHOCYTES # BLD AUTO: 1.94 10*3/MM3 (ref 0.7–3.1)
LYMPHOCYTES NFR BLD AUTO: 20.8 % (ref 19.6–45.3)
MCH RBC QN AUTO: 28.8 PG (ref 26.6–33)
MCHC RBC AUTO-ENTMCNC: 30.2 G/DL (ref 31.5–35.7)
MCV RBC AUTO: 95.4 FL (ref 79–97)
MONOCYTES # BLD AUTO: 0.86 10*3/MM3 (ref 0.1–0.9)
MONOCYTES NFR BLD AUTO: 9.2 % (ref 5–12)
NEUTROPHILS NFR BLD AUTO: 6.23 10*3/MM3 (ref 1.7–7)
NEUTROPHILS NFR BLD AUTO: 66.8 % (ref 42.7–76)
NT-PROBNP SERPL-MCNC: 631 PG/ML (ref 0–900)
PLATELET # BLD AUTO: 235 10*3/MM3 (ref 140–450)
PMV BLD AUTO: 11.4 FL (ref 6–12)
POTASSIUM SERPL-SCNC: 4.4 MMOL/L (ref 3.5–5.2)
PROT SERPL-MCNC: 7.4 G/DL (ref 6–8.5)
QT INTERVAL: 385 MS
QTC INTERVAL: 399 MS
RBC # BLD AUTO: 4.1 10*6/MM3 (ref 4.14–5.8)
SODIUM SERPL-SCNC: 139 MMOL/L (ref 136–145)
TROPONIN T DELTA: 0 NG/L
TROPONIN T SERPL HS-MCNC: 15 NG/L
WBC NRBC COR # BLD AUTO: 9.33 10*3/MM3 (ref 3.4–10.8)
WHOLE BLOOD HOLD COAG: NORMAL
WHOLE BLOOD HOLD SPECIMEN: NORMAL

## 2023-11-28 PROCEDURE — 71046 X-RAY EXAM CHEST 2 VIEWS: CPT

## 2023-11-28 PROCEDURE — 99284 EMERGENCY DEPT VISIT MOD MDM: CPT

## 2023-11-28 PROCEDURE — 80053 COMPREHEN METABOLIC PANEL: CPT | Performed by: EMERGENCY MEDICINE

## 2023-11-28 PROCEDURE — 93005 ELECTROCARDIOGRAM TRACING: CPT

## 2023-11-28 PROCEDURE — 85025 COMPLETE CBC W/AUTO DIFF WBC: CPT | Performed by: EMERGENCY MEDICINE

## 2023-11-28 PROCEDURE — 36415 COLL VENOUS BLD VENIPUNCTURE: CPT

## 2023-11-28 PROCEDURE — 93005 ELECTROCARDIOGRAM TRACING: CPT | Performed by: EMERGENCY MEDICINE

## 2023-11-28 PROCEDURE — 93010 ELECTROCARDIOGRAM REPORT: CPT | Performed by: INTERNAL MEDICINE

## 2023-11-28 PROCEDURE — 83880 ASSAY OF NATRIURETIC PEPTIDE: CPT | Performed by: EMERGENCY MEDICINE

## 2023-11-28 PROCEDURE — 84484 ASSAY OF TROPONIN QUANT: CPT | Performed by: EMERGENCY MEDICINE

## 2023-11-28 RX ORDER — GABAPENTIN 100 MG/1
100 CAPSULE ORAL 2 TIMES DAILY
COMMUNITY

## 2023-11-28 RX ORDER — CLOTRIMAZOLE AND BETAMETHASONE DIPROPIONATE 10; .64 MG/G; MG/G
1 CREAM TOPICAL 2 TIMES DAILY
COMMUNITY

## 2023-11-28 RX ORDER — SODIUM CHLORIDE 0.9 % (FLUSH) 0.9 %
10 SYRINGE (ML) INJECTION AS NEEDED
Status: DISCONTINUED | OUTPATIENT
Start: 2023-11-28 | End: 2023-11-28 | Stop reason: HOSPADM

## 2023-11-28 RX ORDER — DILTIAZEM HYDROCHLORIDE 180 MG/1
180 CAPSULE, COATED, EXTENDED RELEASE ORAL DAILY
COMMUNITY

## 2023-11-28 RX ORDER — FLUTICASONE PROPIONATE 50 MCG
2 SPRAY, SUSPENSION (ML) NASAL DAILY
COMMUNITY

## 2023-11-28 RX ORDER — SERTRALINE HYDROCHLORIDE 25 MG/1
25 TABLET, FILM COATED ORAL DAILY
COMMUNITY

## 2023-11-28 RX ORDER — PANCRELIPASE LIPASE, PANCRELIPASE PROTEASE, PANCRELIPASE AMYLASE 15000; 47000; 63000 [USP'U]/1; [USP'U]/1; [USP'U]/1
2 CAPSULE, DELAYED RELEASE ORAL 2 TIMES DAILY WITH MEALS
COMMUNITY

## 2023-11-28 RX ORDER — POTASSIUM CHLORIDE 750 MG/1
20 CAPSULE, EXTENDED RELEASE ORAL DAILY
COMMUNITY

## 2023-11-28 RX ORDER — LORATADINE 10 MG/1
10 TABLET ORAL DAILY
COMMUNITY

## 2023-11-28 NOTE — ED PROVIDER NOTES
Subjective     History provided by:  EMS personnel, nursing home and medical records    History of Present Illness    Chief complaint: Chest tightness    Location: Left chest    Quality/Severity: The patient reportedly complained of left chest tightness per the nursing home.    Timing/Onset: Today.    Modifying Factors: None known.    Associated symptoms: None reported.    Narrative: The patient is a 73-year-old white male who is a resident at the [facility].  He was sent in for evaluation for left chest tightness that reportedly started today.  The patient has aphasia due to a previous stroke and is unable to elaborate on the history of the present illness.  His past medical history he has a history of permanent A-fib for which he is anticoagulated with Eliquis.  He has a history of sick sinus syndrome with a pacemaker in place.  He has a history of heart failure and reportedly his Lasix dose was recently increased.  He has a history of COPD secondary to smoking, and he was smoking as of a year ago.  He has a history of alcoholic pancreatitis.  Review of his records in epic show that he had a normal myocardial perfusion stress test 7/9/2021 with a normal EF of 70%.    Review of Systems  Past Medical History:   Diagnosis Date    Arthritis     Emphysema lung     High cholesterol     Hypertension     Pancreatitis     Shortness of breath     Thyroid disease      /64   Pulse 52   Temp 97.9 °F (36.6 °C) (Oral)   Resp 20   Wt 82.5 kg (181 lb 12.8 oz)   SpO2 90%   BMI 31.21 kg/m²     Past Medical History:   Diagnosis Date    Arthritis     Emphysema lung     High cholesterol     Hypertension     Pancreatitis     Shortness of breath     Thyroid disease        The encounter diagnosis was Chest tightness.  XR Chest 2 View   Final Result   Negative chest.       This report was finalized on 11/28/2023 11:59 AM by Dr. Teddy Magana MD.            Final diagnoses:   Chest tightness     No Known  Allergies    Past Surgical History:   Procedure Laterality Date    CATARACT EXTRACTION      HEMORRHOIDECTOMY      NEPHRECTOMY PARTIAL         Family History   Problem Relation Age of Onset    Stroke Mother        Social History     Socioeconomic History    Marital status: Single   Tobacco Use    Smoking status: Every Day     Packs/day: 1     Types: Cigarettes    Smokeless tobacco: Never   Substance and Sexual Activity    Alcohol use: Yes     Alcohol/week: 35.0 standard drinks of alcohol     Types: 35 Cans of beer per week     Comment: 5-6 beer per day    Drug use: Never    Sexual activity: Defer           Objective   Physical Exam  Vitals and nursing note reviewed.   Constitutional:       General: He is not in acute distress.     Appearance: He is not ill-appearing, toxic-appearing or diaphoretic.      Comments: The patient appears in no acute distress.  Review of his vital signs: He is afebrile with a temperature 97.9, tachypnea with a respiratory rate of 22 with a room air oxygen saturation 92%, heart rate 64, blood pressure slightly elevated 156/67.   HENT:      Head: Normocephalic and atraumatic.      Mouth/Throat:      Mouth: Mucous membranes are moist.      Pharynx: Oropharynx is clear.   Eyes:      General: No scleral icterus.        Right eye: No discharge.      Conjunctiva/sclera: Conjunctivae normal.      Pupils: Pupils are equal, round, and reactive to light.   Cardiovascular:      Rate and Rhythm: Normal rate.      Heart sounds: No murmur heard.  Pulmonary:      Effort: Pulmonary effort is normal.      Breath sounds: Wheezing (Slight bilateral expiratory) present.   Abdominal:      General: Bowel sounds are normal.      Palpations: Abdomen is soft.      Tenderness: There is no abdominal tenderness. There is no guarding.   Musculoskeletal:         General: Normal range of motion.      Cervical back: Normal range of motion and neck supple. No tenderness.      Right lower leg: No edema.      Left lower leg:  No edema.   Skin:     General: Skin is warm and dry.      Capillary Refill: Capillary refill takes less than 2 seconds.   Neurological:      Mental Status: He is alert.      Comments: The patient has dysphagia and is difficult to understand.   Psychiatric:         Mood and Affect: Mood normal.         Procedures           ED Course  ED Course as of 11/28/23 1434   Tue Nov 28, 2023   1136 My interpretation of the patient's EKG tracing performed 11: 23 is atrial fibrillation with a ventricular response of 64, wide QRS complex due to nonspecific interventricular conduction delay, normal axis, no acute ST segment elevation or depression consistent with ischemia, isolated PVC, normal R wave transition.  No change in comparison to tracing performed 11/4/2022. [TP]   1224 Review of the patient's laboratory studies: The patient's high sensitive troponin was negative at 15.  CMP has an elevated creatinine of 1.29 with an upper limits normal BUN of 23 and a diminished GFR of 58.5 which is a worsening compared to 8 months ago.  Electrolytes and liver enzymes within normal limits.  CBC has a normal white count of 9.33 with a normal differential.  Hemoglobin slightly low at 11.8 with a normal hematocrit of 39.1 and normal platelets.  proBNP normal at 631. [TP]   1226 The patient's chest x-ray was interpreted by me and the radiologist as no acute disease. [TP]   1352 Patient's delta troponin is unchanged and negative at 15. [TP]   1353 The patient's EKG shows no acute ischemic changes and is unchanged from last year.  He had a normal myocardial perfusion stress test 2 years ago.  His troponin and delta troponin are negative.  His heart score is a 2 for age. [TP]      ED Course User Index  [TP] Andrew Carreno MD                                          [facility]([id number])@                    Medical Decision Making  My differential diagnosis for chest pain includes but is not limited to:  Muscle strain, costochondritis,  myositis, pleurisy, rib fracture, intercostal neuritis, herpes zoster, tumor, myocardial infarction, coronary syndrome, unstable angina, angina, aortic dissection, mitral valve prolapse, pericarditis, palpitations, pulmonary embolus, pneumonia, pneumothorax, lung cancer, GERD, esophagitis, esophageal spasm     Problems Addressed:  Chest tightness: complicated acute illness or injury    Amount and/or Complexity of Data Reviewed  Labs: ordered. Decision-making details documented in ED Course.  Radiology: ordered. Decision-making details documented in ED Course.  ECG/medicine tests: ordered and independent interpretation performed. Decision-making details documented in ED Course.    Risk  Prescription drug management.        Final diagnoses:   Chest tightness       ED Disposition  ED Disposition       ED Disposition   Discharge    Condition   Stable    Comment   --               Eloisa Wheeler MD  [address] [address] [phone]    Schedule an appointment as soon as possible for a visit   next available         Medication List      No changes were made to your prescriptions during this visit.           Labs Reviewed   COMPREHENSIVE METABOLIC PANEL - Abnormal; Notable for the following components:       Result Value    Creatinine 1.29 (*)     eGFR 58.5 (*)     All other components within normal limits    Narrative:     GFR Normal >60  Chronic Kidney Disease <60  Kidney Failure <15    The GFR formula is only valid for adults with stable renal function between ages 18 and 70.   CBC WITH AUTO DIFFERENTIAL - Abnormal; Notable for the following components:    RBC 4.10 (*)     Hemoglobin 11.8 (*)     MCHC 30.2 (*)     All other components within normal limits   TROPONIN - Normal    Narrative:     High Sensitive Troponin T Reference Range:  <14.0 ng/L- Negative Female for AMI  <22.0 ng/L- Negative Male for AMI  >=14 - Abnormal Female indicating possible myocardial injury.  >=22 - Abnormal  Male indicating possible myocardial injury.   Clinicians would have to utilize clinical acumen, EKG, Troponin, and serial changes to determine if it is an Acute Myocardial Infarction or myocardial injury due to an underlying chronic condition.        BNP (IN-HOUSE) - Normal    Narrative:     This assay is used as an aid in the diagnosis of individuals suspected of having heart failure. It can be used as an aid in the diagnosis of acute decompensated heart failure (ADHF) in patients presenting with signs and symptoms of ADHF to the emergency department (ED). In addition, NT-proBNP of <300 pg/mL indicates ADHF is not likely.    Age Range Result Interpretation  NT-proBNP Concentration (pg/mL:      <50             Positive            >450                   Gray                 300-450                    Negative             <300    50-75           Positive            >900                  Gray                300-900                  Negative            <300      >75             Positive            >1800                  Gray                300-1800                  Negative            <300   HIGH SENSITIVITIY TROPONIN T 2HR - Normal    Narrative:     High Sensitive Troponin T Reference Range:  <14.0 ng/L- Negative Female for AMI  <22.0 ng/L- Negative Male for AMI  >=14 - Abnormal Female indicating possible myocardial injury.  >=22 - Abnormal Male indicating possible myocardial injury.   Clinicians would have to utilize clinical acumen, EKG, Troponin, and serial changes to determine if it is an Acute Myocardial Infarction or myocardial injury due to an underlying chronic condition.        RAINBOW DRAW    Narrative:     The following orders were created for panel order [facility] Draw.  Procedure                               Abnormality         Status                     ---------                               -----------         ------                     Green Top (Gel)[[id number]]                                  Final  result               Lavender Top[[id number]]                                     Final result               Gold Top - SST[[id number]]                                   Final result               Light Blue Top[[id number]]                                   Final result                 Please view results for these tests on the individual orders.   CBC AND DIFFERENTIAL    Narrative:     The following orders were created for panel order CBC & Differential.  Procedure                               Abnormality         Status                     ---------                               -----------         ------                     CBC Auto Differential[[id number]]        Abnormal            Final result                 Please view results for these tests on the individual orders.   GREEN TOP   LAVENDER TOP   GOLD TOP - SST   LIGHT BLUE TOP     XR Chest 2 View   Final Result   Negative chest.       This report was finalized on 11/28/2023 11:59 AM by Dr. Teddy Magana MD.                 Medication List      No changes were made to your prescriptions during this visit.              Andrew Carreno MD  11/28/23 2307

## 2023-12-15 ENCOUNTER — LAB REQUISITION (OUTPATIENT)
Dept: LAB | Facility: HOSPITAL | Age: 73
End: 2023-12-15
Payer: MEDICARE

## 2023-12-15 DIAGNOSIS — R50.9 FEVER, UNSPECIFIED: ICD-10-CM

## 2023-12-15 LAB
B PARAPERT DNA SPEC QL NAA+PROBE: NOT DETECTED
B PERT DNA SPEC QL NAA+PROBE: NOT DETECTED
C PNEUM DNA NPH QL NAA+NON-PROBE: NOT DETECTED
FLUAV SUBTYP SPEC NAA+PROBE: NOT DETECTED
FLUBV RNA ISLT QL NAA+PROBE: NOT DETECTED
HADV DNA SPEC NAA+PROBE: NOT DETECTED
HCOV 229E RNA SPEC QL NAA+PROBE: NOT DETECTED
HCOV HKU1 RNA SPEC QL NAA+PROBE: NOT DETECTED
HCOV NL63 RNA SPEC QL NAA+PROBE: NOT DETECTED
HCOV OC43 RNA SPEC QL NAA+PROBE: NOT DETECTED
HMPV RNA NPH QL NAA+NON-PROBE: NOT DETECTED
HPIV1 RNA ISLT QL NAA+PROBE: NOT DETECTED
HPIV2 RNA SPEC QL NAA+PROBE: NOT DETECTED
HPIV3 RNA NPH QL NAA+PROBE: DETECTED
HPIV4 P GENE NPH QL NAA+PROBE: NOT DETECTED
M PNEUMO IGG SER IA-ACNC: NOT DETECTED
RHINOVIRUS RNA SPEC NAA+PROBE: NOT DETECTED
RSV RNA NPH QL NAA+NON-PROBE: NOT DETECTED
SARS-COV-2 RNA NPH QL NAA+NON-PROBE: NOT DETECTED

## 2023-12-15 PROCEDURE — 0202U NFCT DS 22 TRGT SARS-COV-2: CPT | Performed by: FAMILY MEDICINE

## 2024-01-29 ENCOUNTER — HOSPITAL ENCOUNTER (EMERGENCY)
Facility: HOSPITAL | Age: 74
Discharge: HOME | End: 2024-01-29
Attending: EMERGENCY MEDICINE | Admitting: EMERGENCY MEDICINE
Payer: MEDICARE

## 2024-01-29 VITALS
HEIGHT: 68 IN | DIASTOLIC BLOOD PRESSURE: 63 MMHG | RESPIRATION RATE: 18 BRPM | BODY MASS INDEX: 26.67 KG/M2 | TEMPERATURE: 98.6 F | WEIGHT: 176 LBS | SYSTOLIC BLOOD PRESSURE: 148 MMHG | OXYGEN SATURATION: 94 % | HEART RATE: 80 BPM

## 2024-01-29 DIAGNOSIS — M79.671 RIGHT FOOT PAIN: Primary | ICD-10-CM

## 2024-01-29 PROCEDURE — 99283 EMERGENCY DEPT VISIT LOW MDM: CPT

## 2024-01-29 RX ORDER — HYDROCODONE BITARTRATE AND ACETAMINOPHEN 5; 325 MG/1; MG/1
1 TABLET ORAL ONCE
Status: COMPLETED | OUTPATIENT
Start: 2024-01-29 | End: 2024-01-29

## 2024-01-29 RX ORDER — PREGABALIN 50 MG/1
50 CAPSULE ORAL 2 TIMES DAILY
COMMUNITY

## 2024-01-29 RX ADMIN — HYDROCODONE BITARTRATE AND ACETAMINOPHEN 1 TABLET: 5; 325 TABLET ORAL at 08:35

## 2024-01-29 NOTE — ED PROVIDER NOTES
Subjective   History of Present Illness  73-year-old male presents emergency room for pain.  Patient is nursing home patient with chronic right foot pain for which she receives medication at the nursing home supposedly.  Patient was recently changed from Neurontin to Lyrica 4 days ago and nursing home states that last night his pain was not as well-controlled.  Patient was sent to the emergency room by ambulance by nursing home staff for this reason.  Patient reports no other complaints other than his foot hurt more last night than normal.  Patient reports no injury.  Upon reviewing chart patient had a lawnmower accident long ago that has resulted in him missing 4 out of 5 of his toes.      Review of Systems   Constitutional:  Negative for activity change, appetite change, chills, diaphoresis, fatigue and fever.   HENT:  Negative for congestion, sinus pressure, sneezing and sore throat.    Eyes:  Negative for photophobia and visual disturbance.   Respiratory:  Negative for cough and shortness of breath.    Cardiovascular:  Negative for chest pain, palpitations and leg swelling.   Gastrointestinal:  Negative for abdominal distention, abdominal pain, diarrhea, nausea and vomiting.   Genitourinary:  Negative for dysuria and flank pain.   Musculoskeletal:  Negative for arthralgias, back pain and myalgias.        Right foot pain   Skin:  Negative for rash.   Neurological:  Negative for dizziness, weakness and headaches.   Psychiatric/Behavioral:  Negative for behavioral problems and confusion.        Past Medical History:   Diagnosis Date    Arthritis     Atrial fibrillation, chronic     Emphysema lung     High cholesterol     Hypertension     Pancreatitis     Shortness of breath     Thyroid disease        No Known Allergies    Past Surgical History:   Procedure Laterality Date    CATARACT EXTRACTION      HEMORRHOIDECTOMY      NEPHRECTOMY PARTIAL         Family History   Problem Relation Age of Onset    Stroke Mother         Social History     Socioeconomic History    Marital status: Single   Tobacco Use    Smoking status: Former     Packs/day: 1     Types: Cigarettes    Smokeless tobacco: Never   Substance and Sexual Activity    Alcohol use: Yes     Alcohol/week: 35.0 standard drinks of alcohol     Types: 35 Cans of beer per week     Comment: 5-6 beer per day    Drug use: Never    Sexual activity: Defer           Objective   Physical Exam  Vitals and nursing note reviewed.   Constitutional:       General: He is not in acute distress.     Appearance: Normal appearance. He is not toxic-appearing or diaphoretic.      Comments: Pleasant 73-year-old male in no acute distress   HENT:      Head: Normocephalic and atraumatic.      Mouth/Throat:      Mouth: Mucous membranes are moist.   Eyes:      Conjunctiva/sclera: Conjunctivae normal.   Cardiovascular:      Rate and Rhythm: Normal rate.      Pulses: Normal pulses.   Pulmonary:      Effort: Pulmonary effort is normal. No respiratory distress.      Breath sounds: Normal breath sounds. No wheezing or rales.   Abdominal:      General: Abdomen is flat. There is no distension.      Tenderness: There is no abdominal tenderness.   Musculoskeletal:         General: No swelling or signs of injury. Normal range of motion.      Cervical back: Normal range of motion and neck supple.        Feet:    Feet:      Comments: Patient is missing all toes on his right foot except for his great toe.  Patient has dorsal pedal and posterior tibial pulses on his right.  Patient is able to move his right great toe with no difficulty.  Dorsiflexion and plantarflexion are mildly diminished but patient states this is normal.  Patient is neurovascular intact in his right lower extremity.  No signs of infection or vascular compromise noted.  Patient does have a small callus on the medial distal portion of his right great toe but no signs of infection with that either  Skin:     General: Skin is warm and dry.       Findings: No rash.   Neurological:      General: No focal deficit present.      Mental Status: He is alert.   Psychiatric:         Mood and Affect: Mood normal.         Behavior: Behavior normal.         Procedures           ED Course  ED Course as of 01/29/24 0813 Mon Jan 29, 2024   0811 Patient has chronic pain in his right lower extremity.  Patient recently had medication change and states now that his right lower extremity hurts worse than it has in the past.  Patient reports no injury and exam shows no acute abnormality.  Patient was given pain medication in the emergency room and return to the nursing home for management by his primary care physician for his chronic pain control. [BH]      ED Course User Index  [BH] Lj Brown MD                                             Medical Decision Making  My diagnosis for lower extremity pain and injury includes but is not limited to hip fracture, femur fracture, hip dislocation, hip contusion, hip sprain, hip strain, pelvic fracture, ischio-tibial band pain, ischio-tibial band bursitis, knee sprain, patella dislocation, knee dislocation, internal derangement of knee, fractures of the femur, tibia, fibula, ankle, foot and digits, ankle sprain, ankle dislocation, Lisfranc fracture, fracture dislocations of the digits, pulmonary embolism, claudication, peripheral vascular disease, gout, osteoarthritis, rheumatoid arthritis, bursitis, septic joint, poly-rheumatica, polyarthralgia and other inflammatory or infectious disease processes.         Final diagnoses:   Right foot pain       ED Disposition  ED Disposition       ED Disposition   Discharge    Condition   Stable    Comment   --               Debbie Bello, DIAMOND  [address] [address] [phone]    Call       [facility] EMERGENCY DEPARTMENT  [facility] [facility] [address] [phone]  Go to   As needed         Medication List      No changes were made  to your prescriptions during this visit.            Lj Brown MD  01/29/24 0848

## 2024-03-09 ENCOUNTER — LAB REQUISITION (OUTPATIENT)
Dept: LAB | Facility: HOSPITAL | Age: 74
End: 2024-03-09
Payer: MEDICARE

## 2024-03-09 DIAGNOSIS — Z79.899 OTHER LONG TERM (CURRENT) DRUG THERAPY: ICD-10-CM

## 2024-03-09 LAB — VANCOMYCIN TROUGH SERPL-MCNC: 20.8 MCG/ML (ref 5–20)

## 2024-03-09 PROCEDURE — 80202 ASSAY OF VANCOMYCIN: CPT | Performed by: FAMILY MEDICINE

## 2024-03-10 ENCOUNTER — LAB REQUISITION (OUTPATIENT)
Dept: LAB | Facility: HOSPITAL | Age: 74
End: 2024-03-10
Payer: MEDICARE

## 2024-03-10 DIAGNOSIS — M86.9 OSTEOMYELITIS, UNSPECIFIED: ICD-10-CM

## 2024-03-10 DIAGNOSIS — Z47.81 ENCOUNTER FOR ORTHOPEDIC AFTERCARE FOLLOWING SURGICAL AMPUTATION: ICD-10-CM

## 2024-03-10 LAB — VANCOMYCIN TROUGH SERPL-MCNC: 12.3 MCG/ML (ref 5–20)

## 2024-03-10 PROCEDURE — 80202 ASSAY OF VANCOMYCIN: CPT | Performed by: FAMILY MEDICINE

## 2024-03-13 ENCOUNTER — LAB REQUISITION (OUTPATIENT)
Dept: LAB | Facility: HOSPITAL | Age: 74
End: 2024-03-13
Payer: MEDICARE

## 2024-03-13 DIAGNOSIS — B95.62 METHICILLIN RESISTANT STAPHYLOCOCCUS AUREUS INFECTION AS THE CAUSE OF DISEASES CLASSIFIED ELSEWHERE: ICD-10-CM

## 2024-03-13 DIAGNOSIS — M86.9 OSTEOMYELITIS, UNSPECIFIED: ICD-10-CM

## 2024-03-13 LAB — VANCOMYCIN TROUGH SERPL-MCNC: 16.2 MCG/ML (ref 5–20)

## 2024-03-13 PROCEDURE — 80202 ASSAY OF VANCOMYCIN: CPT | Performed by: FAMILY MEDICINE

## 2024-03-16 ENCOUNTER — LAB REQUISITION (OUTPATIENT)
Dept: LAB | Facility: HOSPITAL | Age: 74
End: 2024-03-16
Payer: MEDICARE

## 2024-03-16 DIAGNOSIS — Z47.81 ENCOUNTER FOR ORTHOPEDIC AFTERCARE FOLLOWING SURGICAL AMPUTATION: ICD-10-CM

## 2024-03-16 DIAGNOSIS — M86.9 OSTEOMYELITIS, UNSPECIFIED: ICD-10-CM

## 2024-03-16 LAB — VANCOMYCIN TROUGH SERPL-MCNC: 17.5 MCG/ML (ref 5–20)

## 2024-03-16 PROCEDURE — 80202 ASSAY OF VANCOMYCIN: CPT

## 2024-03-16 PROCEDURE — 80202 ASSAY OF VANCOMYCIN: CPT | Performed by: FAMILY MEDICINE

## 2024-03-20 ENCOUNTER — LAB REQUISITION (OUTPATIENT)
Dept: LAB | Facility: HOSPITAL | Age: 74
End: 2024-03-20
Payer: MEDICARE

## 2024-03-20 DIAGNOSIS — B95.62 METHICILLIN RESISTANT STAPHYLOCOCCUS AUREUS INFECTION AS THE CAUSE OF DISEASES CLASSIFIED ELSEWHERE: ICD-10-CM

## 2024-03-20 DIAGNOSIS — M86.9 OSTEOMYELITIS, UNSPECIFIED: ICD-10-CM

## 2024-03-20 DIAGNOSIS — Z47.81 ENCOUNTER FOR ORTHOPEDIC AFTERCARE FOLLOWING SURGICAL AMPUTATION: ICD-10-CM

## 2024-03-20 LAB — VANCOMYCIN TROUGH SERPL-MCNC: 14.4 MCG/ML (ref 5–20)

## 2024-03-20 PROCEDURE — 80202 ASSAY OF VANCOMYCIN: CPT | Performed by: FAMILY MEDICINE

## 2024-03-24 ENCOUNTER — LAB REQUISITION (OUTPATIENT)
Dept: LAB | Facility: HOSPITAL | Age: 74
End: 2024-03-24
Payer: MEDICARE

## 2024-03-24 DIAGNOSIS — Z79.899 OTHER LONG TERM (CURRENT) DRUG THERAPY: ICD-10-CM

## 2024-03-24 LAB — VANCOMYCIN TROUGH SERPL-MCNC: 12.4 MCG/ML (ref 5–20)

## 2024-03-24 PROCEDURE — 80202 ASSAY OF VANCOMYCIN: CPT | Performed by: FAMILY MEDICINE

## 2024-03-27 ENCOUNTER — LAB REQUISITION (OUTPATIENT)
Dept: LAB | Facility: HOSPITAL | Age: 74
End: 2024-03-27
Payer: MEDICARE

## 2024-03-27 DIAGNOSIS — M86.9 OSTEOMYELITIS, UNSPECIFIED: ICD-10-CM

## 2024-03-27 DIAGNOSIS — Z89.411 ACQUIRED ABSENCE OF RIGHT GREAT TOE: ICD-10-CM

## 2024-03-27 LAB — VANCOMYCIN TROUGH SERPL-MCNC: 11.8 MCG/ML (ref 5–20)

## 2024-03-27 PROCEDURE — 80202 ASSAY OF VANCOMYCIN: CPT | Performed by: FAMILY MEDICINE

## 2024-03-30 ENCOUNTER — LAB REQUISITION (OUTPATIENT)
Dept: LAB | Facility: HOSPITAL | Age: 74
End: 2024-03-30
Payer: MEDICARE

## 2024-03-30 DIAGNOSIS — Z79.899 OTHER LONG TERM (CURRENT) DRUG THERAPY: ICD-10-CM

## 2024-03-30 LAB
BASOPHILS # BLD AUTO: 0.06 10*3/MM3 (ref 0–0.2)
BASOPHILS NFR BLD AUTO: 0.9 % (ref 0–1.5)
CRP SERPL-MCNC: 0.4 MG/DL (ref 0–0.5)
DEPRECATED RDW RBC AUTO: 71.7 FL (ref 37–54)
EOSINOPHIL # BLD AUTO: 0.18 10*3/MM3 (ref 0–0.4)
EOSINOPHIL NFR BLD AUTO: 2.7 % (ref 0.3–6.2)
ERYTHROCYTE [DISTWIDTH] IN BLOOD BY AUTOMATED COUNT: 19.2 % (ref 12.3–15.4)
HCT VFR BLD AUTO: 32.3 % (ref 37.5–51)
HGB BLD-MCNC: 9.6 G/DL (ref 13–17.7)
IMM GRANULOCYTES # BLD AUTO: 0.21 10*3/MM3 (ref 0–0.05)
IMM GRANULOCYTES NFR BLD AUTO: 3.2 % (ref 0–0.5)
LYMPHOCYTES # BLD AUTO: 1.43 10*3/MM3 (ref 0.7–3.1)
LYMPHOCYTES NFR BLD AUTO: 21.7 % (ref 19.6–45.3)
MCH RBC QN AUTO: 30.2 PG (ref 26.6–33)
MCHC RBC AUTO-ENTMCNC: 29.7 G/DL (ref 31.5–35.7)
MCV RBC AUTO: 101.6 FL (ref 79–97)
MONOCYTES # BLD AUTO: 0.64 10*3/MM3 (ref 0.1–0.9)
MONOCYTES NFR BLD AUTO: 9.7 % (ref 5–12)
NEUTROPHILS NFR BLD AUTO: 4.08 10*3/MM3 (ref 1.7–7)
NEUTROPHILS NFR BLD AUTO: 61.8 % (ref 42.7–76)
NRBC BLD AUTO-RTO: 0 /100 WBC (ref 0–0.2)
PLATELET # BLD AUTO: 231 10*3/MM3 (ref 140–450)
PMV BLD AUTO: 12.3 FL (ref 6–12)
RBC # BLD AUTO: 3.18 10*6/MM3 (ref 4.14–5.8)
VANCOMYCIN TROUGH SERPL-MCNC: 20.1 MCG/ML (ref 5–20)
WBC NRBC COR # BLD AUTO: 6.6 10*3/MM3 (ref 3.4–10.8)

## 2024-03-30 PROCEDURE — 86140 C-REACTIVE PROTEIN: CPT | Performed by: FAMILY MEDICINE

## 2024-03-30 PROCEDURE — 80202 ASSAY OF VANCOMYCIN: CPT | Performed by: FAMILY MEDICINE

## 2024-03-30 PROCEDURE — 85025 COMPLETE CBC W/AUTO DIFF WBC: CPT | Performed by: FAMILY MEDICINE

## 2024-06-24 ENCOUNTER — LAB REQUISITION (OUTPATIENT)
Dept: LAB | Facility: HOSPITAL | Age: 74
End: 2024-06-24
Payer: MEDICARE

## 2024-06-24 ENCOUNTER — APPOINTMENT (OUTPATIENT)
Dept: WOUND CARE | Facility: HOSPITAL | Age: 74
End: 2024-06-24
Payer: MEDICARE

## 2024-06-24 DIAGNOSIS — L97.512 NON-PRESSURE CHRONIC ULCER OF OTHER PART OF RIGHT FOOT WITH FAT LAYER EXPOSED: ICD-10-CM

## 2024-06-24 PROCEDURE — 87077 CULTURE AEROBIC IDENTIFY: CPT | Performed by: NURSE PRACTITIONER

## 2024-06-24 PROCEDURE — G0463 HOSPITAL OUTPT CLINIC VISIT: HCPCS

## 2024-06-24 PROCEDURE — 87075 CULTR BACTERIA EXCEPT BLOOD: CPT | Performed by: NURSE PRACTITIONER

## 2024-06-24 PROCEDURE — 87070 CULTURE OTHR SPECIMN AEROBIC: CPT | Performed by: NURSE PRACTITIONER

## 2024-06-24 PROCEDURE — 87205 SMEAR GRAM STAIN: CPT | Performed by: NURSE PRACTITIONER

## 2024-06-24 PROCEDURE — 87186 SC STD MICRODIL/AGAR DIL: CPT | Performed by: NURSE PRACTITIONER

## 2024-06-27 ENCOUNTER — LAB REQUISITION (OUTPATIENT)
Dept: LAB | Facility: HOSPITAL | Age: 74
End: 2024-06-27
Payer: MEDICARE

## 2024-06-27 DIAGNOSIS — M86.9 OSTEOMYELITIS, UNSPECIFIED: ICD-10-CM

## 2024-06-27 DIAGNOSIS — I11.9 HYPERTENSIVE HEART DISEASE WITHOUT HEART FAILURE: ICD-10-CM

## 2024-06-27 LAB
ANION GAP SERPL CALCULATED.3IONS-SCNC: 11.9 MMOL/L (ref 5–15)
BACTERIA SPEC AEROBE CULT: ABNORMAL
BACTERIA SPEC AEROBE CULT: ABNORMAL
BASOPHILS # BLD AUTO: 0.04 10*3/MM3 (ref 0–0.2)
BASOPHILS NFR BLD AUTO: 0.4 % (ref 0–1.5)
BUN SERPL-MCNC: 36 MG/DL (ref 8–23)
BUN/CREAT SERPL: 28.1 (ref 7–25)
CALCIUM SPEC-SCNC: 9 MG/DL (ref 8.6–10.5)
CHLORIDE SERPL-SCNC: 107 MMOL/L (ref 98–107)
CO2 SERPL-SCNC: 24.1 MMOL/L (ref 22–29)
CREAT SERPL-MCNC: 1.28 MG/DL (ref 0.76–1.27)
DEPRECATED RDW RBC AUTO: 60.5 FL (ref 37–54)
EGFRCR SERPLBLD CKD-EPI 2021: 59.1 ML/MIN/1.73
EOSINOPHIL # BLD AUTO: 0.17 10*3/MM3 (ref 0–0.4)
EOSINOPHIL NFR BLD AUTO: 1.9 % (ref 0.3–6.2)
ERYTHROCYTE [DISTWIDTH] IN BLOOD BY AUTOMATED COUNT: 17.4 % (ref 12.3–15.4)
GLUCOSE SERPL-MCNC: 98 MG/DL (ref 65–99)
GRAM STN SPEC: ABNORMAL
GRAM STN SPEC: ABNORMAL
HCT VFR BLD AUTO: 28.5 % (ref 37.5–51)
HGB BLD-MCNC: 8.7 G/DL (ref 13–17.7)
IMM GRANULOCYTES # BLD AUTO: 0.04 10*3/MM3 (ref 0–0.05)
IMM GRANULOCYTES NFR BLD AUTO: 0.4 % (ref 0–0.5)
LYMPHOCYTES # BLD AUTO: 1.31 10*3/MM3 (ref 0.7–3.1)
LYMPHOCYTES NFR BLD AUTO: 14.6 % (ref 19.6–45.3)
MCH RBC QN AUTO: 28.9 PG (ref 26.6–33)
MCHC RBC AUTO-ENTMCNC: 30.5 G/DL (ref 31.5–35.7)
MCV RBC AUTO: 94.7 FL (ref 79–97)
MONOCYTES # BLD AUTO: 0.89 10*3/MM3 (ref 0.1–0.9)
MONOCYTES NFR BLD AUTO: 9.9 % (ref 5–12)
NEUTROPHILS NFR BLD AUTO: 6.5 10*3/MM3 (ref 1.7–7)
NEUTROPHILS NFR BLD AUTO: 72.8 % (ref 42.7–76)
NRBC BLD AUTO-RTO: 0 /100 WBC (ref 0–0.2)
PLATELET # BLD AUTO: 175 10*3/MM3 (ref 140–450)
PMV BLD AUTO: 12.3 FL (ref 6–12)
POTASSIUM SERPL-SCNC: 4 MMOL/L (ref 3.5–5.2)
RBC # BLD AUTO: 3.01 10*6/MM3 (ref 4.14–5.8)
SODIUM SERPL-SCNC: 143 MMOL/L (ref 136–145)
WBC NRBC COR # BLD AUTO: 8.95 10*3/MM3 (ref 3.4–10.8)

## 2024-06-27 PROCEDURE — 80048 BASIC METABOLIC PNL TOTAL CA: CPT | Performed by: FAMILY MEDICINE

## 2024-06-27 PROCEDURE — 85025 COMPLETE CBC W/AUTO DIFF WBC: CPT | Performed by: FAMILY MEDICINE

## 2024-06-29 LAB — BACTERIA SPEC ANAEROBE CULT: NORMAL

## 2024-07-01 ENCOUNTER — APPOINTMENT (OUTPATIENT)
Dept: WOUND CARE | Facility: HOSPITAL | Age: 74
End: 2024-07-01
Payer: MEDICARE

## 2024-07-01 PROCEDURE — G0463 HOSPITAL OUTPT CLINIC VISIT: HCPCS

## 2024-09-16 ENCOUNTER — APPOINTMENT (OUTPATIENT)
Dept: CT IMAGING | Facility: HOSPITAL | Age: 74
End: 2024-09-16
Payer: MEDICARE

## 2024-09-16 ENCOUNTER — HOSPITAL ENCOUNTER (OUTPATIENT)
Facility: HOSPITAL | Age: 74
LOS: 1 days | Discharge: NURSING, REHAB OR LONG-TERM CARE | End: 2024-09-17
Attending: EMERGENCY MEDICINE | Admitting: INTERNAL MEDICINE
Payer: MEDICARE

## 2024-09-16 ENCOUNTER — APPOINTMENT (OUTPATIENT)
Dept: GENERAL RADIOLOGY | Facility: HOSPITAL | Age: 74
End: 2024-09-16
Payer: MEDICARE

## 2024-09-16 DIAGNOSIS — S82.51XA CLOSED AVULSION FRACTURE OF MEDIAL MALLEOLUS OF RIGHT TIBIA, INITIAL ENCOUNTER: ICD-10-CM

## 2024-09-16 DIAGNOSIS — S82.401A CLOSED FRACTURE OF SHAFT OF RIGHT FIBULA, UNSPECIFIED FRACTURE MORPHOLOGY, INITIAL ENCOUNTER: ICD-10-CM

## 2024-09-16 DIAGNOSIS — S82.55XA NONDISPLACED FRACTURE OF MEDIAL MALLEOLUS OF LEFT TIBIA, INITIAL ENCOUNTER FOR CLOSED FRACTURE: ICD-10-CM
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
## 2024-09-16 DIAGNOSIS — S82.391A CLOSED FRACTURE OF POSTERIOR MALLEOLUS OF RIGHT TIBIA, INITIAL ENCOUNTER: Primary | ICD-10-CM

## 2024-09-16 PROBLEM — S82.409A FIBULA FRACTURE: Status: ACTIVE | Noted: 2024-09-16

## 2024-09-16 LAB
ALBUMIN SERPL-MCNC: 3.9 G/DL (ref 3.5–5.2)
ALBUMIN/GLOB SERPL: 1.1 G/DL
ALP SERPL-CCNC: 93 U/L (ref 39–117)
ALT SERPL W P-5'-P-CCNC: 6 U/L (ref 1–41)
ANION GAP SERPL CALCULATED.3IONS-SCNC: 10.4 MMOL/L (ref 5–15)
APTT PPP: 37 SECONDS (ref 24.3–38.1)
AST SERPL-CCNC: 24 U/L (ref 1–40)
BASOPHILS # BLD AUTO: 0.04 10*3/MM3 (ref 0–0.2)
BASOPHILS NFR BLD AUTO: 0.4 % (ref 0–1.5)
BILIRUB SERPL-MCNC: 0.3 MG/DL (ref 0–1.2)
BUN SERPL-MCNC: 20 MG/DL (ref 8–23)
BUN/CREAT SERPL: 17.5 (ref 7–25)
CALCIUM SPEC-SCNC: 9.2 MG/DL (ref 8.6–10.5)
CHLORIDE SERPL-SCNC: 107 MMOL/L (ref 98–107)
CO2 SERPL-SCNC: 26.6 MMOL/L (ref 22–29)
CREAT SERPL-MCNC: 1.14 MG/DL (ref 0.76–1.27)
DEPRECATED RDW RBC AUTO: 56.6 FL (ref 37–54)
EGFRCR SERPLBLD CKD-EPI 2021: 67.9 ML/MIN/1.73
EOSINOPHIL # BLD AUTO: 0.26 10*3/MM3 (ref 0–0.4)
EOSINOPHIL NFR BLD AUTO: 2.8 % (ref 0.3–6.2)
ERYTHROCYTE [DISTWIDTH] IN BLOOD BY AUTOMATED COUNT: 16.7 % (ref 12.3–15.4)
GLOBULIN UR ELPH-MCNC: 3.6 GM/DL
GLUCOSE SERPL-MCNC: 85 MG/DL (ref 65–99)
HCT VFR BLD AUTO: 34.4 % (ref 37.5–51)
HGB BLD-MCNC: 10.8 G/DL (ref 13–17.7)
IMM GRANULOCYTES # BLD AUTO: 0.07 10*3/MM3 (ref 0–0.05)
IMM GRANULOCYTES NFR BLD AUTO: 0.7 % (ref 0–0.5)
INR PPP: 1.18 (ref 0.9–1.1)
LYMPHOCYTES # BLD AUTO: 1.73 10*3/MM3 (ref 0.7–3.1)
LYMPHOCYTES NFR BLD AUTO: 18.3 % (ref 19.6–45.3)
MCH RBC QN AUTO: 29.1 PG (ref 26.6–33)
MCHC RBC AUTO-ENTMCNC: 31.4 G/DL (ref 31.5–35.7)
MCV RBC AUTO: 92.7 FL (ref 79–97)
MONOCYTES # BLD AUTO: 0.73 10*3/MM3 (ref 0.1–0.9)
MONOCYTES NFR BLD AUTO: 7.7 % (ref 5–12)
NEUTROPHILS NFR BLD AUTO: 6.6 10*3/MM3 (ref 1.7–7)
NEUTROPHILS NFR BLD AUTO: 70.1 % (ref 42.7–76)
NRBC BLD AUTO-RTO: 0 /100 WBC (ref 0–0.2)
PLATELET # BLD AUTO: 209 10*3/MM3 (ref 140–450)
PMV BLD AUTO: 12 FL (ref 6–12)
POTASSIUM SERPL-SCNC: 4.4 MMOL/L (ref 3.5–5.2)
PROT SERPL-MCNC: 7.5 G/DL (ref 6–8.5)
PROTHROMBIN TIME: 15.4 SECONDS (ref 12.1–15)
RBC # BLD AUTO: 3.71 10*6/MM3 (ref 4.14–5.8)
SODIUM SERPL-SCNC: 144 MMOL/L (ref 136–145)
WBC NRBC COR # BLD AUTO: 9.43 10*3/MM3 (ref 3.4–10.8)

## 2024-09-16 PROCEDURE — 25010000002 FENTANYL CITRATE (PF) 50 MCG/ML SOLUTION: Performed by: EMERGENCY MEDICINE

## 2024-09-16 PROCEDURE — 73590 X-RAY EXAM OF LOWER LEG: CPT

## 2024-09-16 PROCEDURE — 94761 N-INVAS EAR/PLS OXIMETRY MLT: CPT

## 2024-09-16 PROCEDURE — 85610 PROTHROMBIN TIME: CPT | Performed by: EMERGENCY MEDICINE

## 2024-09-16 PROCEDURE — 85730 THROMBOPLASTIN TIME PARTIAL: CPT | Performed by: EMERGENCY MEDICINE

## 2024-09-16 PROCEDURE — 96374 THER/PROPH/DIAG INJ IV PUSH: CPT

## 2024-09-16 PROCEDURE — 25010000002 MORPHINE PER 10 MG: Performed by: FAMILY MEDICINE

## 2024-09-16 PROCEDURE — 80053 COMPREHEN METABOLIC PANEL: CPT | Performed by: EMERGENCY MEDICINE

## 2024-09-16 PROCEDURE — 99223 1ST HOSP IP/OBS HIGH 75: CPT | Performed by: FAMILY MEDICINE

## 2024-09-16 PROCEDURE — 85025 COMPLETE CBC W/AUTO DIFF WBC: CPT | Performed by: EMERGENCY MEDICINE

## 2024-09-16 PROCEDURE — 99285 EMERGENCY DEPT VISIT HI MDM: CPT | Performed by: EMERGENCY MEDICINE

## 2024-09-16 PROCEDURE — 25010000002 FENTANYL CITRATE (PF) 50 MCG/ML SOLUTION

## 2024-09-16 PROCEDURE — 94640 AIRWAY INHALATION TREATMENT: CPT

## 2024-09-16 PROCEDURE — 96375 TX/PRO/DX INJ NEW DRUG ADDON: CPT

## 2024-09-16 RX ORDER — SACCHAROMYCES BOULARDII 250 MG
250 CAPSULE ORAL 2 TIMES DAILY
COMMUNITY

## 2024-09-16 RX ORDER — BISACODYL 5 MG/1
5 TABLET, DELAYED RELEASE ORAL DAILY PRN
Status: DISCONTINUED | OUTPATIENT
Start: 2024-09-16 | End: 2024-09-17 | Stop reason: HOSPADM

## 2024-09-16 RX ORDER — CLOPIDOGREL BISULFATE 75 MG/1
75 TABLET ORAL DAILY
Status: DISCONTINUED | OUTPATIENT
Start: 2024-09-16 | End: 2024-09-17 | Stop reason: HOSPADM

## 2024-09-16 RX ORDER — LOSARTAN POTASSIUM 50 MG/1
25 TABLET ORAL DAILY
Status: DISCONTINUED | OUTPATIENT
Start: 2024-09-16 | End: 2024-09-17 | Stop reason: HOSPADM

## 2024-09-16 RX ORDER — SODIUM CHLORIDE 9 MG/ML
40 INJECTION, SOLUTION INTRAVENOUS AS NEEDED
Status: DISCONTINUED | OUTPATIENT
Start: 2024-09-16 | End: 2024-09-17 | Stop reason: HOSPADM

## 2024-09-16 RX ORDER — POTASSIUM CHLORIDE 1500 MG/1
20 TABLET, EXTENDED RELEASE ORAL 2 TIMES DAILY
COMMUNITY

## 2024-09-16 RX ORDER — MIRTAZAPINE 15 MG/1
15 TABLET, ORALLY DISINTEGRATING ORAL NIGHTLY
COMMUNITY

## 2024-09-16 RX ORDER — AMOXICILLIN 250 MG
1 CAPSULE ORAL DAILY
COMMUNITY

## 2024-09-16 RX ORDER — ACETAMINOPHEN 325 MG/1
650 TABLET ORAL EVERY 6 HOURS PRN
COMMUNITY

## 2024-09-16 RX ORDER — ASCORBIC ACID 500 MG
500 TABLET ORAL DAILY
Status: DISCONTINUED | OUTPATIENT
Start: 2024-09-16 | End: 2024-09-17 | Stop reason: HOSPADM

## 2024-09-16 RX ORDER — SODIUM CHLORIDE 0.9 % (FLUSH) 0.9 %
10 SYRINGE (ML) INJECTION EVERY 12 HOURS SCHEDULED
Status: DISCONTINUED | OUTPATIENT
Start: 2024-09-16 | End: 2024-09-17 | Stop reason: HOSPADM

## 2024-09-16 RX ORDER — MULTIPLE VITAMINS W/ MINERALS TAB 9MG-400MCG
1 TAB ORAL DAILY
Status: DISCONTINUED | OUTPATIENT
Start: 2024-09-16 | End: 2024-09-17 | Stop reason: HOSPADM

## 2024-09-16 RX ORDER — NALOXONE HCL 0.4 MG/ML
0.4 VIAL (ML) INJECTION
Status: DISCONTINUED | OUTPATIENT
Start: 2024-09-16 | End: 2024-09-17 | Stop reason: HOSPADM

## 2024-09-16 RX ORDER — HYDROXYZINE HYDROCHLORIDE 25 MG/1
25 TABLET, FILM COATED ORAL 3 TIMES DAILY PRN
Status: DISCONTINUED | OUTPATIENT
Start: 2024-09-16 | End: 2024-09-17 | Stop reason: HOSPADM

## 2024-09-16 RX ORDER — HYDROXYZINE HYDROCHLORIDE 25 MG/1
25 TABLET, FILM COATED ORAL 3 TIMES DAILY PRN
COMMUNITY

## 2024-09-16 RX ORDER — HYDROCODONE BITARTRATE AND ACETAMINOPHEN 5; 325 MG/1; MG/1
1 TABLET ORAL EVERY 4 HOURS PRN
COMMUNITY

## 2024-09-16 RX ORDER — LEVOTHYROXINE SODIUM 50 UG/1
50 TABLET ORAL DAILY
Status: DISCONTINUED | OUTPATIENT
Start: 2024-09-16 | End: 2024-09-17 | Stop reason: HOSPADM

## 2024-09-16 RX ORDER — SACCHAROMYCES BOULARDII 250 MG
250 CAPSULE ORAL 2 TIMES DAILY
Status: DISCONTINUED | OUTPATIENT
Start: 2024-09-16 | End: 2024-09-17 | Stop reason: HOSPADM

## 2024-09-16 RX ORDER — FENTANYL CITRATE 50 UG/ML
50 INJECTION, SOLUTION INTRAMUSCULAR; INTRAVENOUS ONCE
Status: DISCONTINUED | OUTPATIENT
Start: 2024-09-16 | End: 2024-09-17 | Stop reason: HOSPADM

## 2024-09-16 RX ORDER — FENTANYL CITRATE 50 UG/ML
50 INJECTION, SOLUTION INTRAMUSCULAR; INTRAVENOUS ONCE
Status: COMPLETED | OUTPATIENT
Start: 2024-09-16 | End: 2024-09-16

## 2024-09-16 RX ORDER — FUROSEMIDE 40 MG
40 TABLET ORAL 2 TIMES DAILY
Status: DISCONTINUED | OUTPATIENT
Start: 2024-09-16 | End: 2024-09-17 | Stop reason: HOSPADM

## 2024-09-16 RX ORDER — BISACODYL 10 MG
10 SUPPOSITORY, RECTAL RECTAL DAILY PRN
Status: DISCONTINUED | OUTPATIENT
Start: 2024-09-16 | End: 2024-09-17 | Stop reason: HOSPADM

## 2024-09-16 RX ORDER — TOPIRAMATE 25 MG/1
25 TABLET, FILM COATED ORAL 2 TIMES DAILY
Status: DISCONTINUED | OUTPATIENT
Start: 2024-09-16 | End: 2024-09-17 | Stop reason: HOSPADM

## 2024-09-16 RX ORDER — ALLOPURINOL 100 MG/1
50 TABLET ORAL DAILY
Status: DISCONTINUED | OUTPATIENT
Start: 2024-09-16 | End: 2024-09-17 | Stop reason: HOSPADM

## 2024-09-16 RX ORDER — ATORVASTATIN CALCIUM 40 MG/1
40 TABLET, FILM COATED ORAL DAILY
Status: DISCONTINUED | OUTPATIENT
Start: 2024-09-16 | End: 2024-09-17 | Stop reason: HOSPADM

## 2024-09-16 RX ORDER — ONDANSETRON 2 MG/ML
4 INJECTION INTRAMUSCULAR; INTRAVENOUS EVERY 6 HOURS PRN
Status: DISCONTINUED | OUTPATIENT
Start: 2024-09-16 | End: 2024-09-17 | Stop reason: HOSPADM

## 2024-09-16 RX ORDER — PANTOPRAZOLE SODIUM 40 MG/1
40 TABLET, DELAYED RELEASE ORAL DAILY
Status: DISCONTINUED | OUTPATIENT
Start: 2024-09-16 | End: 2024-09-17 | Stop reason: HOSPADM

## 2024-09-16 RX ORDER — FOLIC ACID 1 MG/1
1 TABLET ORAL DAILY
COMMUNITY

## 2024-09-16 RX ORDER — IPRATROPIUM BROMIDE AND ALBUTEROL SULFATE 2.5; .5 MG/3ML; MG/3ML
3 SOLUTION RESPIRATORY (INHALATION) EVERY 4 HOURS PRN
Status: DISCONTINUED | OUTPATIENT
Start: 2024-09-16 | End: 2024-09-17 | Stop reason: HOSPADM

## 2024-09-16 RX ORDER — METOPROLOL TARTRATE 25 MG/1
25 TABLET, FILM COATED ORAL 2 TIMES DAILY
COMMUNITY

## 2024-09-16 RX ORDER — MULTIVIT WITH MINERALS/LUTEIN
500 TABLET ORAL DAILY
COMMUNITY

## 2024-09-16 RX ORDER — PREGABALIN 50 MG/1
50 CAPSULE ORAL 2 TIMES DAILY
Status: DISCONTINUED | OUTPATIENT
Start: 2024-09-16 | End: 2024-09-17 | Stop reason: HOSPADM

## 2024-09-16 RX ORDER — LOSARTAN POTASSIUM 25 MG/1
25 TABLET ORAL DAILY
COMMUNITY

## 2024-09-16 RX ORDER — LEVOTHYROXINE SODIUM 50 UG/1
50 TABLET ORAL DAILY
COMMUNITY

## 2024-09-16 RX ORDER — PANTOPRAZOLE SODIUM 40 MG/1
40 TABLET, DELAYED RELEASE ORAL DAILY
COMMUNITY

## 2024-09-16 RX ORDER — CLOPIDOGREL BISULFATE 75 MG/1
75 TABLET ORAL DAILY
COMMUNITY

## 2024-09-16 RX ORDER — TOPIRAMATE 25 MG/1
25 TABLET, FILM COATED ORAL 2 TIMES DAILY
COMMUNITY

## 2024-09-16 RX ORDER — SODIUM CHLORIDE 0.9 % (FLUSH) 0.9 %
10 SYRINGE (ML) INJECTION AS NEEDED
Status: DISCONTINUED | OUTPATIENT
Start: 2024-09-16 | End: 2024-09-17 | Stop reason: HOSPADM

## 2024-09-16 RX ORDER — FENTANYL CITRATE 50 UG/ML
INJECTION, SOLUTION INTRAMUSCULAR; INTRAVENOUS
Status: DISPENSED
Start: 2024-09-16 | End: 2024-09-17

## 2024-09-16 RX ORDER — FENTANYL CITRATE 50 UG/ML
INJECTION, SOLUTION INTRAMUSCULAR; INTRAVENOUS
Status: COMPLETED
Start: 2024-09-16 | End: 2024-09-16

## 2024-09-16 RX ORDER — LANOLIN ALCOHOL/MO/W.PET/CERES
1000 CREAM (GRAM) TOPICAL DAILY
COMMUNITY

## 2024-09-16 RX ORDER — MULTIPLE VITAMINS W/ MINERALS TAB 9MG-400MCG
1 TAB ORAL DAILY
COMMUNITY

## 2024-09-16 RX ORDER — POTASSIUM CHLORIDE 1500 MG/1
20 TABLET, EXTENDED RELEASE ORAL 2 TIMES DAILY
Status: DISCONTINUED | OUTPATIENT
Start: 2024-09-16 | End: 2024-09-17 | Stop reason: HOSPADM

## 2024-09-16 RX ORDER — ALLOPURINOL 100 MG/1
50 TABLET ORAL DAILY
COMMUNITY

## 2024-09-16 RX ORDER — AMOXICILLIN 250 MG
2 CAPSULE ORAL 2 TIMES DAILY PRN
Status: DISCONTINUED | OUTPATIENT
Start: 2024-09-16 | End: 2024-09-17 | Stop reason: HOSPADM

## 2024-09-16 RX ORDER — FENTANYL CITRATE 50 UG/ML
1 INJECTION, SOLUTION INTRAMUSCULAR; INTRAVENOUS ONCE
Status: DISCONTINUED | OUTPATIENT
Start: 2024-09-16 | End: 2024-09-16

## 2024-09-16 RX ORDER — ATORVASTATIN CALCIUM 40 MG/1
40 TABLET, FILM COATED ORAL NIGHTLY
COMMUNITY

## 2024-09-16 RX ORDER — ACETAMINOPHEN 650 MG
1 TABLET, EXTENDED RELEASE ORAL AS NEEDED
COMMUNITY

## 2024-09-16 RX ORDER — METOPROLOL TARTRATE 25 MG/1
25 TABLET, FILM COATED ORAL 2 TIMES DAILY
Status: DISCONTINUED | OUTPATIENT
Start: 2024-09-16 | End: 2024-09-17 | Stop reason: HOSPADM

## 2024-09-16 RX ORDER — FOLIC ACID 1 MG/1
1 TABLET ORAL DAILY
Status: DISCONTINUED | OUTPATIENT
Start: 2024-09-16 | End: 2024-09-17 | Stop reason: HOSPADM

## 2024-09-16 RX ORDER — TAMSULOSIN HYDROCHLORIDE 0.4 MG/1
1 CAPSULE ORAL DAILY
COMMUNITY

## 2024-09-16 RX ORDER — FUROSEMIDE 40 MG
40 TABLET ORAL 2 TIMES DAILY
COMMUNITY

## 2024-09-16 RX ORDER — PHENOL 1.4 %
600 AEROSOL, SPRAY (ML) MUCOUS MEMBRANE DAILY
COMMUNITY

## 2024-09-16 RX ORDER — POLYETHYLENE GLYCOL 3350 17 G/17G
17 POWDER, FOR SOLUTION ORAL DAILY PRN
Status: DISCONTINUED | OUTPATIENT
Start: 2024-09-16 | End: 2024-09-17 | Stop reason: HOSPADM

## 2024-09-16 RX ORDER — CLOTRIMAZOLE 1 %
1 CREAM (GRAM) TOPICAL 2 TIMES DAILY
COMMUNITY

## 2024-09-16 RX ORDER — HYDROCODONE BITARTRATE AND ACETAMINOPHEN 5; 325 MG/1; MG/1
1 TABLET ORAL EVERY 6 HOURS PRN
Status: DISCONTINUED | OUTPATIENT
Start: 2024-09-16 | End: 2024-09-17 | Stop reason: HOSPADM

## 2024-09-16 RX ORDER — TAMSULOSIN HYDROCHLORIDE 0.4 MG/1
0.4 CAPSULE ORAL DAILY
Status: DISCONTINUED | OUTPATIENT
Start: 2024-09-16 | End: 2024-09-17 | Stop reason: HOSPADM

## 2024-09-16 RX ORDER — MIRTAZAPINE 15 MG/1
15 TABLET, FILM COATED ORAL NIGHTLY
Status: DISCONTINUED | OUTPATIENT
Start: 2024-09-16 | End: 2024-09-17 | Stop reason: HOSPADM

## 2024-09-16 RX ORDER — BUDESONIDE AND FORMOTEROL FUMARATE DIHYDRATE 160; 4.5 UG/1; UG/1
2 AEROSOL RESPIRATORY (INHALATION)
Status: DISCONTINUED | OUTPATIENT
Start: 2024-09-16 | End: 2024-09-17 | Stop reason: HOSPADM

## 2024-09-16 RX ADMIN — SERTRALINE 25 MG: 50 TABLET, FILM COATED ORAL at 18:42

## 2024-09-16 RX ADMIN — TAMSULOSIN HYDROCHLORIDE 0.4 MG: 0.4 CAPSULE ORAL at 18:42

## 2024-09-16 RX ADMIN — Medication 250 MG: at 21:57

## 2024-09-16 RX ADMIN — FENTANYL CITRATE 50 MCG: 50 INJECTION, SOLUTION INTRAMUSCULAR; INTRAVENOUS at 12:43

## 2024-09-16 RX ADMIN — FENTANYL CITRATE 50 MCG: 50 INJECTION INTRAMUSCULAR; INTRAVENOUS at 14:00

## 2024-09-16 RX ADMIN — PREGABALIN 50 MG: 50 CAPSULE ORAL at 21:58

## 2024-09-16 RX ADMIN — CLOPIDOGREL BISULFATE 75 MG: 75 TABLET ORAL at 18:42

## 2024-09-16 RX ADMIN — MIRTAZAPINE 15 MG: 15 TABLET, FILM COATED ORAL at 21:57

## 2024-09-16 RX ADMIN — HYDROCODONE BITARTRATE AND ACETAMINOPHEN 1 TABLET: 5; 325 TABLET ORAL at 18:06

## 2024-09-16 RX ADMIN — HYDROXYZINE HYDROCHLORIDE 25 MG: 25 TABLET ORAL at 21:57

## 2024-09-16 RX ADMIN — POTASSIUM CHLORIDE 20 MEQ: 1500 TABLET, EXTENDED RELEASE ORAL at 21:57

## 2024-09-16 RX ADMIN — FUROSEMIDE 40 MG: 40 TABLET ORAL at 21:58

## 2024-09-16 RX ADMIN — METOPROLOL TARTRATE 25 MG: 25 TABLET, FILM COATED ORAL at 21:58

## 2024-09-16 RX ADMIN — MORPHINE SULFATE 4 MG: 4 INJECTION, SOLUTION INTRAMUSCULAR; INTRAVENOUS at 21:45

## 2024-09-16 RX ADMIN — TOPIRAMATE 25 MG: 25 TABLET, FILM COATED ORAL at 21:57

## 2024-09-16 RX ADMIN — ATORVASTATIN CALCIUM 40 MG: 40 TABLET, FILM COATED ORAL at 18:42

## 2024-09-16 RX ADMIN — Medication 10 ML: at 21:45

## 2024-09-16 RX ADMIN — BUDESONIDE AND FORMOTEROL FUMARATE DIHYDRATE 2 PUFF: 160; 4.5 AEROSOL RESPIRATORY (INHALATION) at 20:09

## 2024-09-17 VITALS
HEIGHT: 70 IN | HEART RATE: 72 BPM | DIASTOLIC BLOOD PRESSURE: 58 MMHG | TEMPERATURE: 98 F | WEIGHT: 175 LBS | OXYGEN SATURATION: 92 % | BODY MASS INDEX: 25.05 KG/M2 | SYSTOLIC BLOOD PRESSURE: 116 MMHG | RESPIRATION RATE: 20 BRPM

## 2024-09-17 LAB
ANION GAP SERPL CALCULATED.3IONS-SCNC: 10.6 MMOL/L (ref 5–15)
BUN SERPL-MCNC: 23 MG/DL (ref 8–23)
BUN/CREAT SERPL: 20 (ref 7–25)
CALCIUM SPEC-SCNC: 8.5 MG/DL (ref 8.6–10.5)
CHLORIDE SERPL-SCNC: 104 MMOL/L (ref 98–107)
CO2 SERPL-SCNC: 26.4 MMOL/L (ref 22–29)
CREAT SERPL-MCNC: 1.15 MG/DL (ref 0.76–1.27)
DEPRECATED RDW RBC AUTO: 57 FL (ref 37–54)
EGFRCR SERPLBLD CKD-EPI 2021: 67.2 ML/MIN/1.73
ERYTHROCYTE [DISTWIDTH] IN BLOOD BY AUTOMATED COUNT: 17 % (ref 12.3–15.4)
GLUCOSE SERPL-MCNC: 104 MG/DL (ref 65–99)
HCT VFR BLD AUTO: 29.9 % (ref 37.5–51)
HGB BLD-MCNC: 9.5 G/DL (ref 13–17.7)
MCH RBC QN AUTO: 29.1 PG (ref 26.6–33)
MCHC RBC AUTO-ENTMCNC: 31.8 G/DL (ref 31.5–35.7)
MCV RBC AUTO: 91.4 FL (ref 79–97)
PLATELET # BLD AUTO: 172 10*3/MM3 (ref 140–450)
PMV BLD AUTO: 12.1 FL (ref 6–12)
POTASSIUM SERPL-SCNC: 3.7 MMOL/L (ref 3.5–5.2)
RBC # BLD AUTO: 3.27 10*6/MM3 (ref 4.14–5.8)
SODIUM SERPL-SCNC: 141 MMOL/L (ref 136–145)
WBC NRBC COR # BLD AUTO: 9.84 10*3/MM3 (ref 3.4–10.8)

## 2024-09-17 PROCEDURE — 63710000001 SACCHAROMYCES BOULARDII 250 MG CAPSULE: Performed by: FAMILY MEDICINE

## 2024-09-17 PROCEDURE — 63710000001 POTASSIUM CHLORIDE 20 MEQ TABLET CONTROLLED-RELEASE: Performed by: FAMILY MEDICINE

## 2024-09-17 PROCEDURE — A9270 NON-COVERED ITEM OR SERVICE: HCPCS | Performed by: FAMILY MEDICINE

## 2024-09-17 PROCEDURE — 99239 HOSP IP/OBS DSCHRG MGMT >30: CPT | Performed by: INTERNAL MEDICINE

## 2024-09-17 PROCEDURE — 63710000001 PANCRELIPASE (LIP-PROT-AMYL) 12000-38000 UNITS CAPSULE DELAYED-RELEASE PARTICLES: Performed by: FAMILY MEDICINE

## 2024-09-17 PROCEDURE — G0378 HOSPITAL OBSERVATION PER HR: HCPCS

## 2024-09-17 PROCEDURE — 63710000001 TIOTROPIUM BROMIDE MONOHYDRATE 2.5 MCG/ACT AEROSOL SOLUTION 4 G INHALER: Performed by: FAMILY MEDICINE

## 2024-09-17 PROCEDURE — 85027 COMPLETE CBC AUTOMATED: CPT | Performed by: FAMILY MEDICINE

## 2024-09-17 PROCEDURE — 63710000001 CLOPIDOGREL 75 MG TABLET: Performed by: FAMILY MEDICINE

## 2024-09-17 PROCEDURE — 63710000001 PREGABALIN 50 MG CAPSULE: Performed by: FAMILY MEDICINE

## 2024-09-17 PROCEDURE — 63710000001 FUROSEMIDE 40 MG TABLET: Performed by: FAMILY MEDICINE

## 2024-09-17 PROCEDURE — 63710000001 TAMSULOSIN 0.4 MG CAPSULE: Performed by: FAMILY MEDICINE

## 2024-09-17 PROCEDURE — 63710000001 ALLOPURINOL 100 MG TABLET: Performed by: FAMILY MEDICINE

## 2024-09-17 PROCEDURE — 63710000001 METOPROLOL TARTRATE 25 MG TABLET: Performed by: FAMILY MEDICINE

## 2024-09-17 PROCEDURE — 63710000001 FOLIC ACID 1 MG TABLET: Performed by: FAMILY MEDICINE

## 2024-09-17 PROCEDURE — 63710000001 LEVOTHYROXINE 50 MCG TABLET: Performed by: FAMILY MEDICINE

## 2024-09-17 PROCEDURE — 80048 BASIC METABOLIC PNL TOTAL CA: CPT | Performed by: FAMILY MEDICINE

## 2024-09-17 PROCEDURE — 63710000001 VITAMIN C 500 MG TABLET: Performed by: FAMILY MEDICINE

## 2024-09-17 PROCEDURE — 63710000001 LOSARTAN 50 MG TABLET: Performed by: FAMILY MEDICINE

## 2024-09-17 PROCEDURE — 63710000001 PANTOPRAZOLE 40 MG TABLET DELAYED-RELEASE: Performed by: FAMILY MEDICINE

## 2024-09-17 PROCEDURE — 63710000001 SERTRALINE 50 MG TABLET: Performed by: FAMILY MEDICINE

## 2024-09-17 PROCEDURE — 63710000001 MULTIVITAMIN WITH MINERALS TABLET: Performed by: FAMILY MEDICINE

## 2024-09-17 PROCEDURE — 63710000001 ATORVASTATIN 40 MG TABLET: Performed by: FAMILY MEDICINE

## 2024-09-17 PROCEDURE — 94799 UNLISTED PULMONARY SVC/PX: CPT

## 2024-09-17 PROCEDURE — 63710000001 TOPIRAMATE PER 25 MG: Performed by: FAMILY MEDICINE

## 2024-09-17 RX ADMIN — POTASSIUM CHLORIDE 20 MEQ: 1500 TABLET, EXTENDED RELEASE ORAL at 08:25

## 2024-09-17 RX ADMIN — HYDROCODONE BITARTRATE AND ACETAMINOPHEN 1 TABLET: 5; 325 TABLET ORAL at 05:18

## 2024-09-17 RX ADMIN — PANTOPRAZOLE SODIUM 40 MG: 40 TABLET, DELAYED RELEASE ORAL at 08:26

## 2024-09-17 RX ADMIN — PREGABALIN 50 MG: 50 CAPSULE ORAL at 08:25

## 2024-09-17 RX ADMIN — TIOTROPIUM BROMIDE INHALATION SPRAY 2 PUFF: 3.12 SPRAY, METERED RESPIRATORY (INHALATION) at 09:19

## 2024-09-17 RX ADMIN — CLOPIDOGREL BISULFATE 75 MG: 75 TABLET ORAL at 08:25

## 2024-09-17 RX ADMIN — TAMSULOSIN HYDROCHLORIDE 0.4 MG: 0.4 CAPSULE ORAL at 08:26

## 2024-09-17 RX ADMIN — LEVOTHYROXINE SODIUM 50 MCG: 50 TABLET ORAL at 08:24

## 2024-09-17 RX ADMIN — TOPIRAMATE 25 MG: 25 TABLET, FILM COATED ORAL at 08:25

## 2024-09-17 RX ADMIN — LOSARTAN POTASSIUM 25 MG: 50 TABLET, FILM COATED ORAL at 08:25

## 2024-09-17 RX ADMIN — OXYCODONE HYDROCHLORIDE AND ACETAMINOPHEN 500 MG: 500 TABLET ORAL at 08:24

## 2024-09-17 RX ADMIN — PANCRELIPASE 48000 UNITS OF LIPASE: 60000; 12000; 38000 CAPSULE, DELAYED RELEASE PELLETS ORAL at 12:55

## 2024-09-17 RX ADMIN — PANCRELIPASE 48000 UNITS OF LIPASE: 60000; 12000; 38000 CAPSULE, DELAYED RELEASE PELLETS ORAL at 08:24

## 2024-09-17 RX ADMIN — Medication 1 TABLET: at 08:25

## 2024-09-17 RX ADMIN — FUROSEMIDE 40 MG: 40 TABLET ORAL at 08:25

## 2024-09-17 RX ADMIN — SERTRALINE 25 MG: 50 TABLET, FILM COATED ORAL at 08:24

## 2024-09-17 RX ADMIN — ATORVASTATIN CALCIUM 40 MG: 40 TABLET, FILM COATED ORAL at 08:25

## 2024-09-17 RX ADMIN — Medication 250 MG: at 08:24

## 2024-09-17 RX ADMIN — FOLIC ACID 1 MG: 1 TABLET ORAL at 08:25

## 2024-09-17 RX ADMIN — METOPROLOL TARTRATE 25 MG: 25 TABLET, FILM COATED ORAL at 08:25

## 2024-09-17 RX ADMIN — BUDESONIDE AND FORMOTEROL FUMARATE DIHYDRATE 2 PUFF: 160; 4.5 AEROSOL RESPIRATORY (INHALATION) at 09:19

## 2024-09-17 RX ADMIN — ALLOPURINOL 50 MG: 100 TABLET ORAL at 08:25

## 2024-10-16 ENCOUNTER — LAB REQUISITION (OUTPATIENT)
Dept: LAB | Facility: HOSPITAL | Age: 74
End: 2024-10-16
Payer: MEDICARE

## 2024-10-16 DIAGNOSIS — L08.89 OTHER SPECIFIED LOCAL INFECTIONS OF THE SKIN AND SUBCUTANEOUS TISSUE: ICD-10-CM

## 2024-10-16 PROCEDURE — 87186 SC STD MICRODIL/AGAR DIL: CPT | Performed by: FAMILY MEDICINE

## 2024-10-16 PROCEDURE — 87077 CULTURE AEROBIC IDENTIFY: CPT | Performed by: FAMILY MEDICINE

## 2024-10-16 PROCEDURE — 87147 CULTURE TYPE IMMUNOLOGIC: CPT | Performed by: FAMILY MEDICINE

## 2024-10-16 PROCEDURE — 87070 CULTURE OTHR SPECIMN AEROBIC: CPT | Performed by: FAMILY MEDICINE

## 2024-10-16 PROCEDURE — 87076 CULTURE ANAEROBE IDENT EACH: CPT | Performed by: FAMILY MEDICINE

## 2024-10-16 PROCEDURE — 87205 SMEAR GRAM STAIN: CPT | Performed by: FAMILY MEDICINE

## 2024-10-20 LAB
BACTERIA SPEC AEROBE CULT: ABNORMAL
GRAM STN SPEC: ABNORMAL
GRAM STN SPEC: ABNORMAL

## 2025-03-06 ENCOUNTER — LAB REQUISITION (OUTPATIENT)
Dept: LAB | Facility: HOSPITAL | Age: 75
End: 2025-03-06
Payer: MEDICARE

## 2025-03-06 DIAGNOSIS — I48.0 PAROXYSMAL ATRIAL FIBRILLATION: ICD-10-CM

## 2025-03-06 DIAGNOSIS — I11.0 HYPERTENSIVE HEART DISEASE WITH HEART FAILURE: ICD-10-CM

## 2025-03-06 LAB
ANION GAP SERPL CALCULATED.3IONS-SCNC: 11.9 MMOL/L (ref 5–15)
BASOPHILS # BLD AUTO: 0.05 10*3/MM3 (ref 0–0.2)
BASOPHILS NFR BLD AUTO: 0.6 % (ref 0–1.5)
BUN SERPL-MCNC: 35 MG/DL (ref 8–23)
BUN/CREAT SERPL: 23.6 (ref 7–25)
CALCIUM SPEC-SCNC: 8.9 MG/DL (ref 8.6–10.5)
CHLORIDE SERPL-SCNC: 104 MMOL/L (ref 98–107)
CO2 SERPL-SCNC: 24.1 MMOL/L (ref 22–29)
CREAT SERPL-MCNC: 1.48 MG/DL (ref 0.76–1.27)
DEPRECATED RDW RBC AUTO: 54.1 FL (ref 37–54)
EGFRCR SERPLBLD CKD-EPI 2021: 49.3 ML/MIN/1.73
EOSINOPHIL # BLD AUTO: 0.26 10*3/MM3 (ref 0–0.4)
EOSINOPHIL NFR BLD AUTO: 2.9 % (ref 0.3–6.2)
ERYTHROCYTE [DISTWIDTH] IN BLOOD BY AUTOMATED COUNT: 15.7 % (ref 12.3–15.4)
GLUCOSE SERPL-MCNC: 129 MG/DL (ref 65–99)
HCT VFR BLD AUTO: 28.4 % (ref 37.5–51)
HGB BLD-MCNC: 8.8 G/DL (ref 13–17.7)
IMM GRANULOCYTES # BLD AUTO: 0.05 10*3/MM3 (ref 0–0.05)
IMM GRANULOCYTES NFR BLD AUTO: 0.6 % (ref 0–0.5)
LYMPHOCYTES # BLD AUTO: 1.33 10*3/MM3 (ref 0.7–3.1)
LYMPHOCYTES NFR BLD AUTO: 14.6 % (ref 19.6–45.3)
MCH RBC QN AUTO: 29.6 PG (ref 26.6–33)
MCHC RBC AUTO-ENTMCNC: 31 G/DL (ref 31.5–35.7)
MCV RBC AUTO: 95.6 FL (ref 79–97)
MONOCYTES # BLD AUTO: 0.68 10*3/MM3 (ref 0.1–0.9)
MONOCYTES NFR BLD AUTO: 7.5 % (ref 5–12)
NEUTROPHILS NFR BLD AUTO: 6.72 10*3/MM3 (ref 1.7–7)
NEUTROPHILS NFR BLD AUTO: 73.8 % (ref 42.7–76)
NRBC BLD AUTO-RTO: 0 /100 WBC (ref 0–0.2)
NT-PROBNP SERPL-MCNC: 3459 PG/ML (ref 0–900)
PLATELET # BLD AUTO: 231 10*3/MM3 (ref 140–450)
PMV BLD AUTO: 12 FL (ref 6–12)
POTASSIUM SERPL-SCNC: 4.2 MMOL/L (ref 3.5–5.2)
RBC # BLD AUTO: 2.97 10*6/MM3 (ref 4.14–5.8)
SODIUM SERPL-SCNC: 140 MMOL/L (ref 136–145)
WBC NRBC COR # BLD AUTO: 9.09 10*3/MM3 (ref 3.4–10.8)

## 2025-03-06 PROCEDURE — 85025 COMPLETE CBC W/AUTO DIFF WBC: CPT | Performed by: FAMILY MEDICINE

## 2025-03-06 PROCEDURE — 83880 ASSAY OF NATRIURETIC PEPTIDE: CPT | Performed by: FAMILY MEDICINE

## 2025-03-06 PROCEDURE — 80048 BASIC METABOLIC PNL TOTAL CA: CPT | Performed by: FAMILY MEDICINE

## 2025-03-10 ENCOUNTER — HOSPITAL ENCOUNTER (EMERGENCY)
Facility: HOSPITAL | Age: 75
Discharge: NURSING, REHAB OR LONG-TERM CARE | End: 2025-03-10
Attending: EMERGENCY MEDICINE | Admitting: EMERGENCY MEDICINE
Payer: MEDICARE

## 2025-03-10 VITALS
SYSTOLIC BLOOD PRESSURE: 156 MMHG | OXYGEN SATURATION: 93 % | HEIGHT: 65 IN | BODY MASS INDEX: 31.02 KG/M2 | HEART RATE: 91 BPM | DIASTOLIC BLOOD PRESSURE: 77 MMHG | WEIGHT: 186.2 LBS | RESPIRATION RATE: 22 BRPM | TEMPERATURE: 97.4 F

## 2025-03-10 DIAGNOSIS — R04.0 EPISTAXIS: Primary | ICD-10-CM

## 2025-03-10 LAB
BASOPHILS # BLD AUTO: 0.06 10*3/MM3 (ref 0–0.2)
BASOPHILS NFR BLD AUTO: 0.7 % (ref 0–1.5)
DEPRECATED RDW RBC AUTO: 55.2 FL (ref 37–54)
EOSINOPHIL # BLD AUTO: 0.23 10*3/MM3 (ref 0–0.4)
EOSINOPHIL NFR BLD AUTO: 2.5 % (ref 0.3–6.2)
ERYTHROCYTE [DISTWIDTH] IN BLOOD BY AUTOMATED COUNT: 15.6 % (ref 12.3–15.4)
HCT VFR BLD AUTO: 28.8 % (ref 37.5–51)
HGB BLD-MCNC: 8.7 G/DL (ref 13–17.7)
IMM GRANULOCYTES # BLD AUTO: 0.06 10*3/MM3 (ref 0–0.05)
IMM GRANULOCYTES NFR BLD AUTO: 0.7 % (ref 0–0.5)
LYMPHOCYTES # BLD AUTO: 1.4 10*3/MM3 (ref 0.7–3.1)
LYMPHOCYTES NFR BLD AUTO: 15.5 % (ref 19.6–45.3)
MCH RBC QN AUTO: 29.1 PG (ref 26.6–33)
MCHC RBC AUTO-ENTMCNC: 30.2 G/DL (ref 31.5–35.7)
MCV RBC AUTO: 96.3 FL (ref 79–97)
MONOCYTES # BLD AUTO: 0.69 10*3/MM3 (ref 0.1–0.9)
MONOCYTES NFR BLD AUTO: 7.6 % (ref 5–12)
NEUTROPHILS NFR BLD AUTO: 6.6 10*3/MM3 (ref 1.7–7)
NEUTROPHILS NFR BLD AUTO: 73 % (ref 42.7–76)
NRBC BLD AUTO-RTO: 0 /100 WBC (ref 0–0.2)
PLATELET # BLD AUTO: 235 10*3/MM3 (ref 140–450)
PMV BLD AUTO: 11.1 FL (ref 6–12)
RBC # BLD AUTO: 2.99 10*6/MM3 (ref 4.14–5.8)
WBC NRBC COR # BLD AUTO: 9.04 10*3/MM3 (ref 3.4–10.8)

## 2025-03-10 PROCEDURE — C9046 COCAINE HCL NASAL SOLUTION: HCPCS | Performed by: EMERGENCY MEDICINE

## 2025-03-10 PROCEDURE — 85025 COMPLETE CBC W/AUTO DIFF WBC: CPT | Performed by: EMERGENCY MEDICINE

## 2025-03-10 PROCEDURE — 99283 EMERGENCY DEPT VISIT LOW MDM: CPT | Performed by: EMERGENCY MEDICINE

## 2025-03-10 PROCEDURE — 25010000002 COCAINE HCL 40 MG/ML SOLUTION: Performed by: EMERGENCY MEDICINE

## 2025-03-10 RX ORDER — CYCLOBENZAPRINE HCL 10 MG
5 TABLET ORAL ONCE
Status: COMPLETED | OUTPATIENT
Start: 2025-03-10 | End: 2025-03-10

## 2025-03-10 RX ORDER — OXYMETAZOLINE HYDROCHLORIDE 0.05 G/100ML
SPRAY NASAL
Status: COMPLETED
Start: 2025-03-10 | End: 2025-03-10

## 2025-03-10 RX ORDER — OXYMETAZOLINE HYDROCHLORIDE 0.05 G/100ML
1 SPRAY NASAL ONCE
Status: COMPLETED | OUTPATIENT
Start: 2025-03-10 | End: 2025-03-10

## 2025-03-10 RX ORDER — COCAINE HYDROCHLORIDE 40 MG/ML
SOLUTION NASAL ONCE
Refills: 0 | Status: COMPLETED | OUTPATIENT
Start: 2025-03-10 | End: 2025-03-10

## 2025-03-10 RX ADMIN — CYCLOBENZAPRINE 5 MG: 10 TABLET, FILM COATED ORAL at 21:27

## 2025-03-10 RX ADMIN — OXYMETAZOLINE HYDROCHLORIDE 1 SPRAY: 0.5 SPRAY NASAL at 20:33

## 2025-03-10 RX ADMIN — OXYMETAZOLINE HYDROCHLORIDE 1 SPRAY: 0.05 SPRAY NASAL at 20:33

## 2025-03-10 RX ADMIN — COCAINE HYDROCHLORIDE NASAL 160 MG: 40 SOLUTION TOPICAL at 19:44

## 2025-03-10 NOTE — ED PROVIDER NOTES
Subjective   History of Present Illness    Chief complaint: Nosebleed    Location: Right nares    Quality/Severity: oozing    Timing/Onset/Duration: This afternoon    Modifying Factors: No history of trauma, on Eliquis    Associated Symptoms: Patient unable to give associated symptoms    Narrative: This 74-year-old presents with nosebleed from the right nares.  Patient is a resident at the [facility].  Patient is on Eliquis.  Patient has history of atrial fibrillation.    PCP:Debbie Bello APRN      Review of Systems   Reason unable to perform ROS: Patient nonverbal.       Past Medical History:   Diagnosis Date    Arthritis     Atrial fibrillation, chronic     Emphysema lung     High cholesterol     Hypertension     Pancreatitis     Shortness of breath     Thyroid disease        Allergies   Allergen Reactions    Prednisone Unknown - Low Severity       Past Surgical History:   Procedure Laterality Date    CATARACT EXTRACTION      HEMORRHOIDECTOMY      NEPHRECTOMY PARTIAL         Family History   Problem Relation Age of Onset    Stroke Mother        Social History     Socioeconomic History    Marital status: Single   Tobacco Use    Smoking status: Former     Current packs/day: 1.00     Types: Cigarettes    Smokeless tobacco: Never   Vaping Use    Vaping status: Never Used   Substance and Sexual Activity    Alcohol use: Yes     Alcohol/week: 35.0 standard drinks of alcohol     Types: 35 Cans of beer per week     Comment: 5-6 beer per day    Drug use: Never    Sexual activity: Defer           Objective   Physical Exam  Vitals (The temperature is 97.4 °F, pulse 58, respirations 20, /89, room air pulse ox 88%.) and nursing note reviewed.   HENT:      Head: Atraumatic.      Nose:      Comments: Right nares oozing blood     Mouth/Throat:      Mouth: Mucous membranes are moist.      Comments: Blood noted in the posterior pharynx  Skin:     Coloration: Skin is not pale.   Neurological:      Mental Status: He is alert.       Comments: Patient at baseline mental status         Procedures           ED Course  ED Course as of 03/10/25 2053   Mon Mar 10, 2025   2053 The hemoglobin is 8.7 and hematocrit 28.  His CBC is otherwise unremarkable [RC]   2053 4 days ago, the hemoglobin was 8.8. [RC]      ED Course User Index  [RC] Dewey Barker MD      20:38 EDT, 03/10/25:  Nose clamp was applied to the right nares this was removed.  Juliano-Synephrine was sprayed in the right nares.  A cottonball soaked in 4% cocaine was placed in the right nares.  The cottonball was removed and the patient was observed.  There is no further bleeding.  The risk versus benefits of putting packing in the right nares was discussed with the patient and family.  They do not elect to place packing in the nose at this time.    21:45 EDT, 03/10/25:  Patient began having some muscle cramps.  He was given Flexeril 5 mg p.o.  This helped with the cramping.    21:45 EDT, 03/10/25:  The patient's diagnosis of epistaxis was discussed with the patient and family.  The patient should not pick his nose.  He should not lift anything heavier than a gallon of milk.  He should not still stoop or bend over or blow his nose for the next 3 days.  The patient should have Neosporin or bacitracin applied to both nares 2 times a day for 3 days.  Patient should follow-up with Debbie Bello this week.  He should return to emergency department there is worsening bleeding, worse anyway at all.  All of the patient's and family's questions were answered the patient will be discharged in good condition.                                                 Medical Decision Making      Final diagnoses:   None       ED Disposition  ED Disposition       None            No follow-up provider specified.       Medication List      No changes were made to your prescriptions during this visit.       No orders to display     Labs Reviewed - No data to display  Lower extremity arterial bypass graft duplex  Result  Date: 3/4/2025  Narrative: Table formatting from the original result was not included. Lower Extremity Bypass Graft Duplex - 33933 (Unilateral) Technique: Duplex evaluation of the lower extremity bypass graft was performed utilizing B-mode, color flow and pulsed-wave Doppler. Indication: Evaluation of bypass graft Duplex Evaluation: [x] Right Lower Extremity [] Left Lower Extremity  Velocity (cm/s) Waveform Morphology Inflow 196 Monophasic Proximal Anastomosis 100 Monophasic Proximal Graft 91 Monophasic   Mid Graft 94 Monophasic Distal Graft 91 Monophasic Distal Anastomosis 80 Monophasic Outflow 117 Monophasic Technologist: Lilliana Fuentes RVT RDMS Technologist Preliminary Findings: Right lower extremity Fem Pop arterial bypass graft without evidence of hemodynamically significant stenosis. As compared to previous examination on 09/03/2024, there has been no significant change. Impression: Visualized arteries of the lower extremities are open and patent with velocities and waveforms as listed above.  Right lower extremity femoral to popliteal artery bypass graft is open and patent without evidence of hemodynamically significant stenosis.  In comparison to prior study, no significant change.    Doppler Ankle Brachial Index Single Level CAR  Result Date: 3/4/2025  Narrative: Table formatting from the original result was not included. Ankle Brachial Index - CPT 92494 Technique: Continuous wave Doppler evaluation of the lower extremity arteries was performed, including pulse volume recordings, Doppler waveforms, and pressures. Indication: Evaluation of bypass graft Doppler Evaluation: Right KATHY Left KATHY Unable To Calculate Unable to Calculate Technologist: Lilliana Fuentes RVT RDMS Technologist Preliminary Findings: Right Lower extremity pressures are non compressible. Left Velocities too low to obtain accurate pressures. Prior 09/03/2024 Right 1.32 Left .43 Impression: Right lower extremity artery pressures are  noncompressible      Final diagnoses:   None         ED Medications:  Medications - No data to display    New Medications:     Medication List        ASK your doctor about these medications      acetaminophen 325 MG tablet  Commonly known as: TYLENOL     albuterol sulfate  (90 Base) MCG/ACT inhaler  Commonly known as: PROVENTIL HFA;VENTOLIN HFA;PROAIR HFA     allopurinol 100 MG tablet  Commonly known as: ZYLOPRIM     apixaban 5 MG tablet tablet  Commonly known as: ELIQUIS  Take 1 tablet by mouth Every 12 (Twelve) Hours.     atorvastatin 40 MG tablet  Commonly known as: LIPITOR     calcium carbonate 600 MG tablet  Commonly known as: OS-KODY     clopidogrel 75 MG tablet  Commonly known as: PLAVIX     clotrimazole 1 % cream  Commonly known as: LOTRIMIN     COMBIVENT IN     folic acid 1 MG tablet  Commonly known as: FOLVITE     furosemide 40 MG tablet  Commonly known as: LASIX     HYDROcodone-acetaminophen 5-325 MG per tablet  Commonly known as: NORCO     hydrOXYzine 25 MG tablet  Commonly known as: ATARAX     levothyroxine 50 MCG tablet  Commonly known as: SYNTHROID, LEVOTHROID     losartan 25 MG tablet  Commonly known as: COZAAR     metoprolol tartrate 25 MG tablet  Commonly known as: LOPRESSOR     mirtazapine 15 MG disintegrating tablet  Commonly known as: REMERON SOL-TAB     multivitamin with minerals tablet tablet     pantoprazole 40 MG EC tablet  Commonly known as: PROTONIX     potassium chloride 20 MEQ CR tablet  Commonly known as: KLOR-CON M20     povidone-iodine 10 % external solution 10%  Commonly known as: BETADINE     pregabalin 50 MG capsule  Commonly known as: LYRICA     saccharomyces boulardii 250 MG capsule  Commonly known as: FLORASTOR     sennosides-docusate 8.6-50 MG per tablet  Commonly known as: PERICOLACE     sertraline 25 MG tablet  Commonly known as: ZOLOFT     tamsulosin 0.4 MG capsule 24 hr capsule  Commonly known as: FLOMAX     topiramate 25 MG tablet  Commonly known as: TOPAMAX      Trelegy Ellipta 200-62.5-25 MCG/INH inhaler  Generic drug: Fluticasone-Umeclidin-Vilant     vitamin B-12 1000 MCG tablet  Commonly known as: CYANOCOBALAMIN     vitamin C 250 MG tablet  Commonly known as: ASCORBIC ACID     Zenpep 40918-27897 units capsule delayed-release particles  Generic drug: Pancrelipase (Lip-Prot-Amyl)              Stopped Medications:     Medication List        ASK your doctor about these medications      acetaminophen 325 MG tablet  Commonly known as: TYLENOL     albuterol sulfate  (90 Base) MCG/ACT inhaler  Commonly known as: PROVENTIL HFA;VENTOLIN HFA;PROAIR HFA     allopurinol 100 MG tablet  Commonly known as: ZYLOPRIM     apixaban 5 MG tablet tablet  Commonly known as: ELIQUIS  Take 1 tablet by mouth Every 12 (Twelve) Hours.     atorvastatin 40 MG tablet  Commonly known as: LIPITOR     calcium carbonate 600 MG tablet  Commonly known as: OS-KODY     clopidogrel 75 MG tablet  Commonly known as: PLAVIX     clotrimazole 1 % cream  Commonly known as: LOTRIMIN     COMBIVENT IN     folic acid 1 MG tablet  Commonly known as: FOLVITE     furosemide 40 MG tablet  Commonly known as: LASIX     HYDROcodone-acetaminophen 5-325 MG per tablet  Commonly known as: NORCO     hydrOXYzine 25 MG tablet  Commonly known as: ATARAX     levothyroxine 50 MCG tablet  Commonly known as: SYNTHROID, LEVOTHROID     losartan 25 MG tablet  Commonly known as: COZAAR     metoprolol tartrate 25 MG tablet  Commonly known as: LOPRESSOR     mirtazapine 15 MG disintegrating tablet  Commonly known as: REMERON SOL-TAB     multivitamin with minerals tablet tablet     pantoprazole 40 MG EC tablet  Commonly known as: PROTONIX     potassium chloride 20 MEQ CR tablet  Commonly known as: KLOR-CON M20     povidone-iodine 10 % external solution 10%  Commonly known as: BETADINE     pregabalin 50 MG capsule  Commonly known as: LYRICA     saccharomyces boulardii 250 MG capsule  Commonly known as: FLORASTOR     sennosides-docusate  8.6-50 MG per tablet  Commonly known as: PERICOLACE     sertraline 25 MG tablet  Commonly known as: ZOLOFT     tamsulosin 0.4 MG capsule 24 hr capsule  Commonly known as: FLOMAX     topiramate 25 MG tablet  Commonly known as: TOPAMAX     Trelegy Ellipta 200-62.5-25 MCG/INH inhaler  Generic drug: Fluticasone-Umeclidin-Vilant     vitamin B-12 1000 MCG tablet  Commonly known as: CYANOCOBALAMIN     vitamin C 250 MG tablet  Commonly known as: ASCORBIC ACID     Zenpep 19261-74899 units capsule delayed-release particles  Generic drug: Pancrelipase (Lip-Prot-Amyl)                   Dewey Barker MD  03/11/25 0028

## 2025-03-11 NOTE — DISCHARGE INSTRUCTIONS
Do not pick your nose, do not blow your nose.  Do not bend over stoop over for 3 days.  Follow-up with Debibe Bello this week.  Return to emergency department if there is worsening bleeding, worse in any way at all

## 2025-03-14 ENCOUNTER — APPOINTMENT (OUTPATIENT)
Dept: GENERAL RADIOLOGY | Facility: HOSPITAL | Age: 75
DRG: 189 | End: 2025-03-14
Payer: MEDICARE

## 2025-03-14 ENCOUNTER — HOSPITAL ENCOUNTER (INPATIENT)
Facility: HOSPITAL | Age: 75
LOS: 1 days | Discharge: NURSING, REHAB OR LONG-TERM CARE | DRG: 189 | End: 2025-03-16
Attending: EMERGENCY MEDICINE | Admitting: FAMILY MEDICINE
Payer: MEDICARE

## 2025-03-14 DIAGNOSIS — J44.1 COPD EXACERBATION: Primary | ICD-10-CM

## 2025-03-14 DIAGNOSIS — M62.838 LEG MUSCLE SPASM: ICD-10-CM

## 2025-03-14 DIAGNOSIS — R21 GROIN RASH: ICD-10-CM

## 2025-03-14 PROBLEM — J44.9 COPD (CHRONIC OBSTRUCTIVE PULMONARY DISEASE): Status: ACTIVE | Noted: 2025-03-14

## 2025-03-14 LAB
ALBUMIN SERPL-MCNC: 4.2 G/DL (ref 3.5–5.2)
ALBUMIN/GLOB SERPL: 1.2 G/DL
ALP SERPL-CCNC: 87 U/L (ref 39–117)
ALT SERPL W P-5'-P-CCNC: 7 U/L (ref 1–41)
ANION GAP SERPL CALCULATED.3IONS-SCNC: 10 MMOL/L (ref 5–15)
ARTERIAL PATENCY WRIST A: POSITIVE
AST SERPL-CCNC: 18 U/L (ref 1–40)
ATMOSPHERIC PRESS: 733 MMHG
BASE EXCESS BLDA CALC-SCNC: -1.5 MMOL/L (ref 0–2)
BASOPHILS # BLD AUTO: 0.04 10*3/MM3 (ref 0–0.2)
BASOPHILS NFR BLD AUTO: 0.4 % (ref 0–1.5)
BDY SITE: ABNORMAL
BILIRUB SERPL-MCNC: 0.2 MG/DL (ref 0–1.2)
BILIRUB UR QL STRIP: NEGATIVE
BODY TEMPERATURE: 37
BUN SERPL-MCNC: 36 MG/DL (ref 8–23)
BUN/CREAT SERPL: 23.7 (ref 7–25)
CALCIUM SPEC-SCNC: 9.4 MG/DL (ref 8.6–10.5)
CHLORIDE SERPL-SCNC: 103 MMOL/L (ref 98–107)
CLARITY UR: CLEAR
CO2 SERPL-SCNC: 23 MMOL/L (ref 22–29)
COLOR UR: YELLOW
CREAT SERPL-MCNC: 1.52 MG/DL (ref 0.76–1.27)
DEPRECATED RDW RBC AUTO: 53.6 FL (ref 37–54)
EGFRCR SERPLBLD CKD-EPI 2021: 47.8 ML/MIN/1.73
EOSINOPHIL # BLD AUTO: 0.26 10*3/MM3 (ref 0–0.4)
EOSINOPHIL NFR BLD AUTO: 2.3 % (ref 0.3–6.2)
ERYTHROCYTE [DISTWIDTH] IN BLOOD BY AUTOMATED COUNT: 15.7 % (ref 12.3–15.4)
FLUAV RNA RESP QL NAA+PROBE: NOT DETECTED
FLUBV RNA RESP QL NAA+PROBE: NOT DETECTED
GAS FLOW AIRWAY: 3 LPM
GEN 5 1HR TROPONIN T REFLEX: 24 NG/L
GLOBULIN UR ELPH-MCNC: 3.6 GM/DL
GLUCOSE SERPL-MCNC: 103 MG/DL (ref 65–99)
GLUCOSE UR STRIP-MCNC: NEGATIVE MG/DL
HCO3 BLDA-SCNC: 24.5 MMOL/L (ref 20–26)
HCT VFR BLD AUTO: 28.6 % (ref 37.5–51)
HGB BLD-MCNC: 8.9 G/DL (ref 13–17.7)
HGB BLDA-MCNC: 9 G/DL (ref 14–18)
HGB UR QL STRIP.AUTO: NEGATIVE
IMM GRANULOCYTES # BLD AUTO: 0.05 10*3/MM3 (ref 0–0.05)
IMM GRANULOCYTES NFR BLD AUTO: 0.4 % (ref 0–0.5)
KETONES UR QL STRIP: NEGATIVE
LEUKOCYTE ESTERASE UR QL STRIP.AUTO: NEGATIVE
LYMPHOCYTES # BLD AUTO: 1.31 10*3/MM3 (ref 0.7–3.1)
LYMPHOCYTES NFR BLD AUTO: 11.7 % (ref 19.6–45.3)
Lab: ABNORMAL
MCH RBC QN AUTO: 29.3 PG (ref 26.6–33)
MCHC RBC AUTO-ENTMCNC: 31.1 G/DL (ref 31.5–35.7)
MCV RBC AUTO: 94.1 FL (ref 79–97)
MODALITY: ABNORMAL
MONOCYTES # BLD AUTO: 0.99 10*3/MM3 (ref 0.1–0.9)
MONOCYTES NFR BLD AUTO: 8.8 % (ref 5–12)
NEUTROPHILS NFR BLD AUTO: 76.4 % (ref 42.7–76)
NEUTROPHILS NFR BLD AUTO: 8.54 10*3/MM3 (ref 1.7–7)
NITRITE UR QL STRIP: NEGATIVE
NRBC BLD AUTO-RTO: 0 /100 WBC (ref 0–0.2)
NT-PROBNP SERPL-MCNC: 1809 PG/ML (ref 0–900)
PCO2 BLDA: 46.8 MM HG (ref 35–45)
PCO2 TEMP ADJ BLD: 46.8 MM HG (ref 35–45)
PH BLDA: 7.33 PH UNITS (ref 7.35–7.45)
PH UR STRIP.AUTO: 5.5 [PH] (ref 4.5–8)
PH, TEMP CORRECTED: 7.33 PH UNITS (ref 7.35–7.45)
PLATELET # BLD AUTO: 259 10*3/MM3 (ref 140–450)
PMV BLD AUTO: 11.7 FL (ref 6–12)
PO2 BLDA: 63.2 MM HG (ref 83–108)
PO2 TEMP ADJ BLD: 63.2 MM HG (ref 83–108)
POTASSIUM SERPL-SCNC: 4.5 MMOL/L (ref 3.5–5.2)
PROT SERPL-MCNC: 7.8 G/DL (ref 6–8.5)
PROT UR QL STRIP: NEGATIVE
QT INTERVAL: 393 MS
QTC INTERVAL: 432 MS
RBC # BLD AUTO: 3.04 10*6/MM3 (ref 4.14–5.8)
RSV RNA RESP QL NAA+PROBE: NOT DETECTED
SAO2 % BLDCOA: 90.8 % (ref 94–99)
SARS-COV-2 RNA RESP QL NAA+PROBE: NOT DETECTED
SODIUM SERPL-SCNC: 136 MMOL/L (ref 136–145)
SP GR UR STRIP: <=1.005 (ref 1–1.03)
TROPONIN T % DELTA: 0
TROPONIN T NUMERIC DELTA: 0 NG/L
TROPONIN T SERPL HS-MCNC: 24 NG/L
UROBILINOGEN UR QL STRIP: NORMAL
WBC NRBC COR # BLD AUTO: 11.19 10*3/MM3 (ref 3.4–10.8)

## 2025-03-14 PROCEDURE — 99285 EMERGENCY DEPT VISIT HI MDM: CPT | Performed by: EMERGENCY MEDICINE

## 2025-03-14 PROCEDURE — 82803 BLOOD GASES ANY COMBINATION: CPT

## 2025-03-14 PROCEDURE — 94761 N-INVAS EAR/PLS OXIMETRY MLT: CPT

## 2025-03-14 PROCEDURE — 36600 WITHDRAWAL OF ARTERIAL BLOOD: CPT

## 2025-03-14 PROCEDURE — 81003 URINALYSIS AUTO W/O SCOPE: CPT | Performed by: PHYSICIAN ASSISTANT

## 2025-03-14 PROCEDURE — 87637 SARSCOV2&INF A&B&RSV AMP PRB: CPT | Performed by: PHYSICIAN ASSISTANT

## 2025-03-14 PROCEDURE — 71045 X-RAY EXAM CHEST 1 VIEW: CPT

## 2025-03-14 PROCEDURE — 99222 1ST HOSP IP/OBS MODERATE 55: CPT | Performed by: FAMILY MEDICINE

## 2025-03-14 PROCEDURE — 25010000002 METHYLPREDNISOLONE PER 125 MG: Performed by: PHYSICIAN ASSISTANT

## 2025-03-14 PROCEDURE — 25010000002 LORAZEPAM PER 2 MG

## 2025-03-14 PROCEDURE — 93010 ELECTROCARDIOGRAM REPORT: CPT | Performed by: STUDENT IN AN ORGANIZED HEALTH CARE EDUCATION/TRAINING PROGRAM

## 2025-03-14 PROCEDURE — G0378 HOSPITAL OBSERVATION PER HR: HCPCS

## 2025-03-14 PROCEDURE — 85025 COMPLETE CBC W/AUTO DIFF WBC: CPT | Performed by: PHYSICIAN ASSISTANT

## 2025-03-14 PROCEDURE — 94640 AIRWAY INHALATION TREATMENT: CPT

## 2025-03-14 PROCEDURE — 80053 COMPREHEN METABOLIC PANEL: CPT | Performed by: PHYSICIAN ASSISTANT

## 2025-03-14 PROCEDURE — 83880 ASSAY OF NATRIURETIC PEPTIDE: CPT | Performed by: PHYSICIAN ASSISTANT

## 2025-03-14 PROCEDURE — 25810000003 SODIUM CHLORIDE 0.9 % SOLUTION: Performed by: FAMILY MEDICINE

## 2025-03-14 PROCEDURE — 94799 UNLISTED PULMONARY SVC/PX: CPT

## 2025-03-14 PROCEDURE — 84484 ASSAY OF TROPONIN QUANT: CPT | Performed by: PHYSICIAN ASSISTANT

## 2025-03-14 PROCEDURE — 93005 ELECTROCARDIOGRAM TRACING: CPT | Performed by: PHYSICIAN ASSISTANT

## 2025-03-14 RX ORDER — IPRATROPIUM BROMIDE AND ALBUTEROL SULFATE 2.5; .5 MG/3ML; MG/3ML
3 SOLUTION RESPIRATORY (INHALATION)
Status: DISCONTINUED | OUTPATIENT
Start: 2025-03-15 | End: 2025-03-16 | Stop reason: HOSPADM

## 2025-03-14 RX ORDER — ACETAMINOPHEN 650 MG/1
650 SUPPOSITORY RECTAL EVERY 4 HOURS PRN
Status: DISCONTINUED | OUTPATIENT
Start: 2025-03-14 | End: 2025-03-16 | Stop reason: HOSPADM

## 2025-03-14 RX ORDER — SODIUM CHLORIDE 0.9 % (FLUSH) 0.9 %
10 SYRINGE (ML) INJECTION EVERY 12 HOURS SCHEDULED
Status: DISCONTINUED | OUTPATIENT
Start: 2025-03-15 | End: 2025-03-16 | Stop reason: HOSPADM

## 2025-03-14 RX ORDER — BISACODYL 5 MG/1
5 TABLET, DELAYED RELEASE ORAL DAILY PRN
Status: DISCONTINUED | OUTPATIENT
Start: 2025-03-14 | End: 2025-03-16 | Stop reason: HOSPADM

## 2025-03-14 RX ORDER — POLYETHYLENE GLYCOL 3350 17 G/17G
17 POWDER, FOR SOLUTION ORAL DAILY PRN
Status: DISCONTINUED | OUTPATIENT
Start: 2025-03-14 | End: 2025-03-16 | Stop reason: HOSPADM

## 2025-03-14 RX ORDER — ONDANSETRON 4 MG/1
4 TABLET, ORALLY DISINTEGRATING ORAL EVERY 6 HOURS PRN
Status: DISCONTINUED | OUTPATIENT
Start: 2025-03-14 | End: 2025-03-16 | Stop reason: HOSPADM

## 2025-03-14 RX ORDER — GUAIFENESIN 600 MG/1
600 TABLET, EXTENDED RELEASE ORAL EVERY 12 HOURS SCHEDULED
Status: DISCONTINUED | OUTPATIENT
Start: 2025-03-15 | End: 2025-03-16 | Stop reason: HOSPADM

## 2025-03-14 RX ORDER — ACETAMINOPHEN 325 MG/1
650 TABLET ORAL EVERY 4 HOURS PRN
Status: DISCONTINUED | OUTPATIENT
Start: 2025-03-14 | End: 2025-03-16 | Stop reason: HOSPADM

## 2025-03-14 RX ORDER — LORAZEPAM 2 MG/ML
INJECTION INTRAMUSCULAR
Status: COMPLETED
Start: 2025-03-14 | End: 2025-03-14

## 2025-03-14 RX ORDER — IPRATROPIUM BROMIDE AND ALBUTEROL SULFATE 2.5; .5 MG/3ML; MG/3ML
3 SOLUTION RESPIRATORY (INHALATION) ONCE
Status: COMPLETED | OUTPATIENT
Start: 2025-03-14 | End: 2025-03-14

## 2025-03-14 RX ORDER — LORAZEPAM 2 MG/ML
1 INJECTION INTRAMUSCULAR ONCE
Status: COMPLETED | OUTPATIENT
Start: 2025-03-14 | End: 2025-03-14

## 2025-03-14 RX ORDER — AMOXICILLIN 250 MG
2 CAPSULE ORAL 2 TIMES DAILY PRN
Status: DISCONTINUED | OUTPATIENT
Start: 2025-03-14 | End: 2025-03-16 | Stop reason: HOSPADM

## 2025-03-14 RX ORDER — DIAZEPAM 5 MG/1
2.5 TABLET ORAL EVERY 6 HOURS PRN
Status: DISCONTINUED | OUTPATIENT
Start: 2025-03-14 | End: 2025-03-16 | Stop reason: HOSPADM

## 2025-03-14 RX ORDER — ACETAMINOPHEN 160 MG/5ML
650 SOLUTION ORAL EVERY 4 HOURS PRN
Status: DISCONTINUED | OUTPATIENT
Start: 2025-03-14 | End: 2025-03-16 | Stop reason: HOSPADM

## 2025-03-14 RX ORDER — METHYLPREDNISOLONE SODIUM SUCCINATE 40 MG/ML
40 INJECTION, POWDER, LYOPHILIZED, FOR SOLUTION INTRAMUSCULAR; INTRAVENOUS EVERY 8 HOURS
Status: DISCONTINUED | OUTPATIENT
Start: 2025-03-15 | End: 2025-03-16 | Stop reason: HOSPADM

## 2025-03-14 RX ORDER — SODIUM CHLORIDE 0.9 % (FLUSH) 0.9 %
10 SYRINGE (ML) INJECTION AS NEEDED
Status: DISCONTINUED | OUTPATIENT
Start: 2025-03-14 | End: 2025-03-16 | Stop reason: HOSPADM

## 2025-03-14 RX ORDER — HYDROCODONE BITARTRATE AND ACETAMINOPHEN 5; 325 MG/1; MG/1
1 TABLET ORAL ONCE
Refills: 0 | Status: COMPLETED | OUTPATIENT
Start: 2025-03-14 | End: 2025-03-14

## 2025-03-14 RX ORDER — METHYLPREDNISOLONE SODIUM SUCCINATE 125 MG/2ML
125 INJECTION, POWDER, LYOPHILIZED, FOR SOLUTION INTRAMUSCULAR; INTRAVENOUS ONCE
Status: COMPLETED | OUTPATIENT
Start: 2025-03-14 | End: 2025-03-14

## 2025-03-14 RX ORDER — SODIUM CHLORIDE 9 MG/ML
40 INJECTION, SOLUTION INTRAVENOUS AS NEEDED
Status: DISCONTINUED | OUTPATIENT
Start: 2025-03-14 | End: 2025-03-16 | Stop reason: HOSPADM

## 2025-03-14 RX ORDER — LORAZEPAM 2 MG/ML
1 INJECTION INTRAMUSCULAR EVERY 6 HOURS PRN
Status: DISCONTINUED | OUTPATIENT
Start: 2025-03-14 | End: 2025-03-16 | Stop reason: HOSPADM

## 2025-03-14 RX ORDER — SODIUM CHLORIDE 9 MG/ML
250 INJECTION, SOLUTION INTRAVENOUS ONCE
Status: COMPLETED | OUTPATIENT
Start: 2025-03-14 | End: 2025-03-14

## 2025-03-14 RX ORDER — IPRATROPIUM BROMIDE AND ALBUTEROL SULFATE 2.5; .5 MG/3ML; MG/3ML
3 SOLUTION RESPIRATORY (INHALATION) EVERY 4 HOURS PRN
Status: DISCONTINUED | OUTPATIENT
Start: 2025-03-14 | End: 2025-03-16 | Stop reason: HOSPADM

## 2025-03-14 RX ORDER — ONDANSETRON 2 MG/ML
4 INJECTION INTRAMUSCULAR; INTRAVENOUS EVERY 6 HOURS PRN
Status: DISCONTINUED | OUTPATIENT
Start: 2025-03-14 | End: 2025-03-16 | Stop reason: HOSPADM

## 2025-03-14 RX ORDER — BISACODYL 10 MG
10 SUPPOSITORY, RECTAL RECTAL DAILY PRN
Status: DISCONTINUED | OUTPATIENT
Start: 2025-03-14 | End: 2025-03-16 | Stop reason: HOSPADM

## 2025-03-14 RX ORDER — LORAZEPAM 2 MG/ML
INJECTION INTRAMUSCULAR
Status: ACTIVE
Start: 2025-03-14 | End: 2025-03-15

## 2025-03-14 RX ORDER — CYCLOBENZAPRINE HCL 10 MG
10 TABLET ORAL ONCE
Status: COMPLETED | OUTPATIENT
Start: 2025-03-14 | End: 2025-03-14

## 2025-03-14 RX ADMIN — LORAZEPAM 1 MG: 2 INJECTION INTRAMUSCULAR; INTRAVENOUS at 20:44

## 2025-03-14 RX ADMIN — IPRATROPIUM BROMIDE AND ALBUTEROL SULFATE 3 ML: .5; 3 SOLUTION RESPIRATORY (INHALATION) at 23:43

## 2025-03-14 RX ADMIN — CYCLOBENZAPRINE 10 MG: 10 TABLET, FILM COATED ORAL at 17:19

## 2025-03-14 RX ADMIN — IPRATROPIUM BROMIDE AND ALBUTEROL SULFATE 3 ML: .5; 3 SOLUTION RESPIRATORY (INHALATION) at 16:55

## 2025-03-14 RX ADMIN — HYDROCODONE BITARTRATE AND ACETAMINOPHEN 1 TABLET: 5; 325 TABLET ORAL at 19:57

## 2025-03-14 RX ADMIN — METHYLPREDNISOLONE SODIUM SUCCINATE 125 MG: 125 INJECTION, POWDER, FOR SOLUTION INTRAMUSCULAR; INTRAVENOUS at 17:00

## 2025-03-14 RX ADMIN — IPRATROPIUM BROMIDE AND ALBUTEROL SULFATE 3 ML: .5; 3 SOLUTION RESPIRATORY (INHALATION) at 18:56

## 2025-03-14 RX ADMIN — LORAZEPAM 1 MG: 2 INJECTION INTRAMUSCULAR at 20:44

## 2025-03-14 RX ADMIN — DIAZEPAM 2.5 MG: 5 TABLET ORAL at 18:38

## 2025-03-14 RX ADMIN — SODIUM CHLORIDE 250 ML/HR: 9 INJECTION, SOLUTION INTRAVENOUS at 22:42

## 2025-03-14 NOTE — ED NOTES
Unable to  a good pulse ox as patient has started having bad leg cramps and is very restless. Head pulse ox applied and still not picking up.

## 2025-03-14 NOTE — ED PROVIDER NOTES
Subjective   History of Present Illness  Patient is a 74-year-old gentleman who presents the emergency room with dyspnea.  He is normally on 2 L nasal cannula.  Sometimes he will go out without it.  He lives in a facility and was found to be breathing heavy.  Paramedics state that on 4 L he was in the 80s.  They put him on a nonrebreather for a while and he came up to 100%.  They did not noticed wheezing and did not give him breathing treatments.    Son-in-law came to be with patient after a while and said patient has have a history of A-fib and has had a pacemaker placed.  He also has issues with a lot of cramping in the left calf which is chronic for him.      Review of Systems   Constitutional: Negative.    HENT:  Positive for congestion.    Respiratory:  Positive for shortness of breath.    Gastrointestinal: Negative.    Genitourinary: Negative.    Musculoskeletal:  Positive for myalgias (Left calf pain spasms which patient says is frequent for him).   Skin: Negative.    Neurological: Negative.    Psychiatric/Behavioral: Negative.     All other systems reviewed and are negative.      Past Medical History:   Diagnosis Date    Anemia     Anxiety     Aphasia     Arthritis     ASHD (arteriosclerotic heart disease)     Atrial fibrillation, chronic     BPH (benign prostatic hyperplasia)     Cardiac pacemaker     Cognitive communication deficit     COPD (chronic obstructive pulmonary disease)     Depression     Dysphagia     Edema     Emphysema lung     Emphysema lung     Heart failure     High cholesterol     Hypertension     Hypokalemia     PAF (paroxysmal atrial fibrillation)     Pancreatitis     Polyneuropathy     PVD (peripheral vascular disease)     Shortness of breath     Stroke     Thyroid disease        Allergies   Allergen Reactions    Prednisone Unknown - Low Severity       Past Surgical History:   Procedure Laterality Date    CATARACT EXTRACTION      HEMORRHOIDECTOMY      NEPHRECTOMY PARTIAL         Family  History   Problem Relation Age of Onset    Stroke Mother        Social History     Socioeconomic History    Marital status: Single   Tobacco Use    Smoking status: Former     Current packs/day: 1.00     Types: Cigarettes    Smokeless tobacco: Never   Vaping Use    Vaping status: Never Used   Substance and Sexual Activity    Alcohol use: Yes     Alcohol/week: 35.0 standard drinks of alcohol     Types: 35 Cans of beer per week     Comment: 5-6 beer per day    Drug use: Never    Sexual activity: Defer           Objective   Physical Exam  Vitals and nursing note reviewed.   Constitutional:       Appearance: He is well-developed.   Cardiovascular:      Rate and Rhythm: Normal rate and regular rhythm.   Pulmonary:      Breath sounds: Decreased breath sounds present.      Comments: Very decreased breath sounds, increased respiratory rate  Chest:      Chest wall: No edema.   Musculoskeletal:      Right lower leg: No tenderness. Edema (Right foot) present.      Left lower leg: No tenderness. No edema.   Skin:     General: Skin is warm and dry.      Capillary Refill: Capillary refill takes less than 2 seconds.      Comments: Small sores on right shin, red rash of groin consistent with Candida   Neurological:      General: No focal deficit present.      Mental Status: He is alert.   Psychiatric:         Mood and Affect: Mood normal.         Behavior: Behavior normal.         ECG 12 Lead      Date/Time: 3/14/2025 4:50 PM    Performed by: Carole Oquendo PA-C  Authorized by: Dewey Barker MD  Interpreted by ED physician  Rhythm: sinus rhythm  Ectopy: atrial premature contractions  Rate: normal  BPM: 72  Clinical impression: non-specific ECG  Comments: QTc 432, normal axis               ED Course                                                       Medical Decision Making  When patient first arrived he was quite dyspneic and was not moving much air.  Given duo nebs and Solu-Medrol.  He has prednisone listed as an allergy but  I noticed he has been given that so I felt the benefit of Solu-Medrol outweighed risk.  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    Is very difficult/patient when he first arrived.  Is very puny.  He did start perking up and breathing more and I do hear lung sounds with some wheezing after the first breathing treatment.  I ordered another 1.  He was read unremarkable and COVID flu and RSV are negative.  He does have some renal insufficiency which was seen at a visit 6 days ago but is not his baseline.  His BNP is 1800 but is actually down from 3400 where it was 6 days ago.    Patient has episodes where he has a lot of pain in the left calf with a calf spasms.  He says this frequently happens to him.  First we gave some Flexeril as he has had that before and he says it did not help very much and so we tried a small dose of Valium.    I have spoken to Dr. Saad Tovar and he agrees to accept patient.  Patient is agreeable with admission as well.  Diagnosis worsening hypoxia with COPD exacerbation.  He also has a groin rash that was cleaned by nurse and barrier cream applied.  He is also having leg spasms in the left calf that he says he frequently has.    Problems Addressed:  COPD exacerbation: complicated acute illness or injury  Groin rash: complicated acute illness or injury  Leg muscle spasm: complicated acute illness or injury    Amount and/or Complexity of Data Reviewed  Labs: ordered.     Details: Labs Reviewed  COMPREHENSIVE METABOLIC PANEL - Abnormal; Notable for the following components:     Glucose                       103 (*)                BUN                           36 (*)                 Creatinine                    1.52 (*)               eGFR                          47.8 (*)            All other components within normal limits         Narrative: GFR Categories in Chronic  Kidney Disease (CKD)                                      GFR Category          GFR (mL/min/1.73)    Interpretation                  G1                     90 or greater         Normal or high (1)                  G2                      60-89                Mild decrease (1)                  G3a                   45-59                Mild to moderate decrease                  G3b                   30-44                Moderate to severe decrease                  G4                    15-29                Severe decrease                  G5                    14 or less           Kidney failure                                          (1)In the absence of evidence of kidney disease, neither GFR category G1 or G2 fulfill the criteria for CKD.                                    eGFR calculation 2021 CKD-EPI creatinine equation, which does not include race as a factor  BNP (IN-HOUSE) - Abnormal; Notable for the following components:     proBNP                        1,809.0 (*)            All other components within normal limits         Narrative: This assay is used as an aid in the diagnosis of individuals suspected of having heart failure. It can be used as an aid in the diagnosis of acute decompensated heart failure (ADHF) in patients presenting with signs and symptoms of ADHF to the emergency department (ED). In addition, NT-proBNP of <300 pg/mL indicates ADHF is not likely.                                    Age Range Result Interpretation  NT-proBNP Concentration (pg/mL:                                                      <50             Positive            >450                                   Gray                 300-450                                    Negative             <300                                    50-75           Positive            >900                                  Potter                300-900                                  Negative            <300                                                       >75             Positive            >1800                                  [place]                300-1800                                  Negative            <300  TROPONIN - Abnormal; Notable for the following components:     HS Troponin T                 24 (*)              All other components within normal limits         Narrative: High Sensitive Troponin T Reference Range:                  <14.0 ng/L- Negative Female for AMI                  <22.0 ng/L- Negative Male for AMI                  >=14 - Abnormal Female indicating possible myocardial injury.                  >=22 - Abnormal Male indicating possible myocardial injury.                   Clinicians would have to utilize clinical acumen, EKG, Troponin, and serial changes to determine if it is an Acute Myocardial Infarction or myocardial injury due to an underlying chronic condition.                                       CBC WITH AUTO DIFFERENTIAL - Abnormal; Notable for the following components:     WBC                           11.19 (*)               RBC                           3.04 (*)               Hemoglobin                    8.9 (*)                Hematocrit                    28.6 (*)               MCHC                          31.1 (*)               RDW                           15.7 (*)               Neutrophil %                  76.4 (*)               Lymphocyte %                  11.7 (*)               Neutrophils, Absolute         8.54 (*)               Monocytes, Absolute           0.99 (*)            All other components within normal limits  HIGH SENSITIVITIY TROPONIN T 1HR - Abnormal; Notable for the following components:     HS Troponin T                 24 (*)              All other components within normal limits         Narrative: High Sensitive Troponin T Reference Range:                  <14.0 ng/L- Negative Female for AMI                  <22.0 ng/L- Negative Male for AMI                  >=14 - Abnormal Female  indicating possible myocardial injury.                  >=22 - Abnormal Male indicating possible myocardial injury.                   Clinicians would have to utilize clinical acumen, EKG, Troponin, and serial changes to determine if it is an Acute Myocardial Infarction or myocardial injury due to an underlying chronic condition.                                       BLOOD GAS, ARTERIAL - Abnormal; Notable for the following components:     pH, Arterial                  7.328 (*)               pCO2, Arterial                46.8 (*)               pO2, Arterial                 63.2 (*)               Base Excess, Arterial         -1.5 (*)               O2 Saturation, Arterial       90.8 (*)               Hemoglobin, Blood Gas         9.0 (*)                pCO2, Temperature Corrected   46.8 (*)               pH, Temp Corrected            7.328 (*)               pO2, Temperature Corrected    63.2 (*)            All other components within normal limits  COVID-19/FLUA&B/RSV, NP SWAB IN TRANSPORT MEDIA 1 HR TAT - Normal         Narrative: Fact sheet for providers: https://www.fda.gov/media/422761/download                    Fact sheet for patients: https://www.fda.gov/media/455874/download                                    Test performed by PCR.  URINALYSIS W/ MICROSCOPIC IF INDICATED (NO CULTURE) - Normal         Narrative: Urine microscopic not indicated.  BLOOD GAS, ARTERIAL  CBC AND DIFFERENTIAL    Radiology: ordered.     Details: XR Chest AP  Result Date: 3/14/2025  Impression: 1.Cardiomegaly. 2.Slight prominence of markings within the interstitium of the lungs which could relate to technique. 3.Haziness right lower lobe that probably reflects summation of shadows. Electronically Signed: Mauro Kilgore MD  3/14/2025 5:51 PM EDT  Workstation ID: [id number]      ECG/medicine tests: ordered and independent interpretation performed.    Risk  Prescription drug management.  Decision regarding hospitalization.        Final  diagnoses:   COPD exacerbation   Groin rash   Leg muscle spasm       ED Disposition  ED Disposition       ED Disposition   Decision to Admit    Condition   --    Comment   Level of Care: Telemetry [5]   Diagnosis: COPD (chronic obstructive pulmonary disease) [547601]   Admitting Physician: IVETTE MARSE [[id number]]   Attending Physician: IVETTE MARES [[id number]]                 No follow-up provider specified.       Medication List      No changes were made to your prescriptions during this visit.            Carole Oquendo PA-C  03/14/25 1936

## 2025-03-15 ENCOUNTER — APPOINTMENT (OUTPATIENT)
Dept: GENERAL RADIOLOGY | Facility: HOSPITAL | Age: 75
DRG: 189 | End: 2025-03-15
Payer: MEDICARE

## 2025-03-15 LAB
ANION GAP SERPL CALCULATED.3IONS-SCNC: 10.7 MMOL/L (ref 5–15)
B PARAPERT DNA SPEC QL NAA+PROBE: NOT DETECTED
B PERT DNA SPEC QL NAA+PROBE: NOT DETECTED
BASOPHILS # BLD AUTO: 0 10*3/MM3 (ref 0–0.2)
BASOPHILS NFR BLD AUTO: 0 % (ref 0–1.5)
BUN SERPL-MCNC: 31 MG/DL (ref 8–23)
BUN/CREAT SERPL: 23.8 (ref 7–25)
C PNEUM DNA NPH QL NAA+NON-PROBE: NOT DETECTED
CALCIUM SPEC-SCNC: 9.2 MG/DL (ref 8.6–10.5)
CHLORIDE SERPL-SCNC: 105 MMOL/L (ref 98–107)
CO2 SERPL-SCNC: 24.3 MMOL/L (ref 22–29)
CREAT SERPL-MCNC: 1.3 MG/DL (ref 0.76–1.27)
DEPRECATED RDW RBC AUTO: 55 FL (ref 37–54)
EGFRCR SERPLBLD CKD-EPI 2021: 57.6 ML/MIN/1.73
EOSINOPHIL # BLD AUTO: 0 10*3/MM3 (ref 0–0.4)
EOSINOPHIL NFR BLD AUTO: 0 % (ref 0.3–6.2)
ERYTHROCYTE [DISTWIDTH] IN BLOOD BY AUTOMATED COUNT: 15.7 % (ref 12.3–15.4)
FLUAV SUBTYP SPEC NAA+PROBE: NOT DETECTED
FLUBV RNA ISLT QL NAA+PROBE: NOT DETECTED
GLUCOSE SERPL-MCNC: 132 MG/DL (ref 65–99)
HADV DNA SPEC NAA+PROBE: NOT DETECTED
HCOV 229E RNA SPEC QL NAA+PROBE: NOT DETECTED
HCOV HKU1 RNA SPEC QL NAA+PROBE: NOT DETECTED
HCOV NL63 RNA SPEC QL NAA+PROBE: NOT DETECTED
HCOV OC43 RNA SPEC QL NAA+PROBE: NOT DETECTED
HCT VFR BLD AUTO: 29.1 % (ref 37.5–51)
HGB BLD-MCNC: 8.9 G/DL (ref 13–17.7)
HMPV RNA NPH QL NAA+NON-PROBE: NOT DETECTED
HPIV1 RNA ISLT QL NAA+PROBE: NOT DETECTED
HPIV2 RNA SPEC QL NAA+PROBE: NOT DETECTED
HPIV3 RNA NPH QL NAA+PROBE: NOT DETECTED
HPIV4 P GENE NPH QL NAA+PROBE: NOT DETECTED
IMM GRANULOCYTES # BLD AUTO: 0.05 10*3/MM3 (ref 0–0.05)
IMM GRANULOCYTES NFR BLD AUTO: 0.5 % (ref 0–0.5)
LYMPHOCYTES # BLD AUTO: 0.85 10*3/MM3 (ref 0.7–3.1)
LYMPHOCYTES NFR BLD AUTO: 8.6 % (ref 19.6–45.3)
M PNEUMO IGG SER IA-ACNC: NOT DETECTED
MCH RBC QN AUTO: 29.6 PG (ref 26.6–33)
MCHC RBC AUTO-ENTMCNC: 30.6 G/DL (ref 31.5–35.7)
MCV RBC AUTO: 96.7 FL (ref 79–97)
MONOCYTES # BLD AUTO: 0.37 10*3/MM3 (ref 0.1–0.9)
MONOCYTES NFR BLD AUTO: 3.7 % (ref 5–12)
NEUTROPHILS NFR BLD AUTO: 8.62 10*3/MM3 (ref 1.7–7)
NEUTROPHILS NFR BLD AUTO: 87.2 % (ref 42.7–76)
NRBC BLD AUTO-RTO: 0 /100 WBC (ref 0–0.2)
PLATELET # BLD AUTO: 232 10*3/MM3 (ref 140–450)
PMV BLD AUTO: 11.3 FL (ref 6–12)
POTASSIUM SERPL-SCNC: 4.4 MMOL/L (ref 3.5–5.2)
PROCALCITONIN SERPL-MCNC: 0.04 NG/ML (ref 0–0.25)
RBC # BLD AUTO: 3.01 10*6/MM3 (ref 4.14–5.8)
RHINOVIRUS RNA SPEC NAA+PROBE: NOT DETECTED
RSV RNA NPH QL NAA+NON-PROBE: NOT DETECTED
SARS-COV-2 RNA NPH QL NAA+NON-PROBE: NOT DETECTED
SODIUM SERPL-SCNC: 140 MMOL/L (ref 136–145)
WBC NRBC COR # BLD AUTO: 9.89 10*3/MM3 (ref 3.4–10.8)

## 2025-03-15 PROCEDURE — 94664 DEMO&/EVAL PT USE INHALER: CPT

## 2025-03-15 PROCEDURE — 25010000002 METHYLPREDNISOLONE PER 40 MG: Performed by: FAMILY MEDICINE

## 2025-03-15 PROCEDURE — 71045 X-RAY EXAM CHEST 1 VIEW: CPT

## 2025-03-15 PROCEDURE — 97162 PT EVAL MOD COMPLEX 30 MIN: CPT | Performed by: PHYSICAL THERAPIST

## 2025-03-15 PROCEDURE — 80048 BASIC METABOLIC PNL TOTAL CA: CPT | Performed by: FAMILY MEDICINE

## 2025-03-15 PROCEDURE — 99232 SBSQ HOSP IP/OBS MODERATE 35: CPT | Performed by: INTERNAL MEDICINE

## 2025-03-15 PROCEDURE — 94799 UNLISTED PULMONARY SVC/PX: CPT

## 2025-03-15 PROCEDURE — 84145 PROCALCITONIN (PCT): CPT | Performed by: FAMILY MEDICINE

## 2025-03-15 PROCEDURE — 0202U NFCT DS 22 TRGT SARS-COV-2: CPT | Performed by: FAMILY MEDICINE

## 2025-03-15 PROCEDURE — 25010000002 LORAZEPAM PER 2 MG: Performed by: FAMILY MEDICINE

## 2025-03-15 PROCEDURE — 85025 COMPLETE CBC W/AUTO DIFF WBC: CPT | Performed by: FAMILY MEDICINE

## 2025-03-15 PROCEDURE — 94761 N-INVAS EAR/PLS OXIMETRY MLT: CPT

## 2025-03-15 RX ORDER — METOPROLOL TARTRATE 25 MG/1
25 TABLET, FILM COATED ORAL EVERY 12 HOURS SCHEDULED
Status: DISCONTINUED | OUTPATIENT
Start: 2025-03-15 | End: 2025-03-16 | Stop reason: HOSPADM

## 2025-03-15 RX ADMIN — METHYLPREDNISOLONE SODIUM SUCCINATE 40 MG: 40 INJECTION, POWDER, FOR SOLUTION INTRAMUSCULAR; INTRAVENOUS at 06:24

## 2025-03-15 RX ADMIN — LORAZEPAM 1 MG: 2 INJECTION INTRAMUSCULAR; INTRAVENOUS at 17:09

## 2025-03-15 RX ADMIN — IPRATROPIUM BROMIDE AND ALBUTEROL SULFATE 3 ML: .5; 3 SOLUTION RESPIRATORY (INHALATION) at 20:50

## 2025-03-15 RX ADMIN — APIXABAN 5 MG: 2.5 TABLET, FILM COATED ORAL at 21:07

## 2025-03-15 RX ADMIN — IPRATROPIUM BROMIDE AND ALBUTEROL SULFATE 3 ML: .5; 3 SOLUTION RESPIRATORY (INHALATION) at 07:42

## 2025-03-15 RX ADMIN — GUAIFENESIN 600 MG: 600 TABLET ORAL at 21:07

## 2025-03-15 RX ADMIN — Medication 10 ML: at 08:04

## 2025-03-15 RX ADMIN — IPRATROPIUM BROMIDE AND ALBUTEROL SULFATE 3 ML: .5; 3 SOLUTION RESPIRATORY (INHALATION) at 12:15

## 2025-03-15 RX ADMIN — Medication 10 ML: at 21:07

## 2025-03-15 RX ADMIN — GUAIFENESIN 600 MG: 600 TABLET ORAL at 09:57

## 2025-03-15 RX ADMIN — METHYLPREDNISOLONE SODIUM SUCCINATE 40 MG: 40 INJECTION, POWDER, FOR SOLUTION INTRAMUSCULAR; INTRAVENOUS at 14:02

## 2025-03-15 RX ADMIN — DIAZEPAM 2.5 MG: 5 TABLET ORAL at 21:07

## 2025-03-15 RX ADMIN — METOPROLOL TARTRATE 25 MG: 25 TABLET, FILM COATED ORAL at 15:35

## 2025-03-15 RX ADMIN — METHYLPREDNISOLONE SODIUM SUCCINATE 40 MG: 40 INJECTION, POWDER, FOR SOLUTION INTRAMUSCULAR; INTRAVENOUS at 21:07

## 2025-03-15 RX ADMIN — METOPROLOL TARTRATE 25 MG: 25 TABLET, FILM COATED ORAL at 21:07

## 2025-03-15 RX ADMIN — IPRATROPIUM BROMIDE AND ALBUTEROL SULFATE 3 ML: .5; 3 SOLUTION RESPIRATORY (INHALATION) at 15:14

## 2025-03-15 RX ADMIN — LORAZEPAM 1 MG: 2 INJECTION INTRAMUSCULAR; INTRAVENOUS at 00:16

## 2025-03-15 RX ADMIN — APIXABAN 5 MG: 2.5 TABLET, FILM COATED ORAL at 15:35

## 2025-03-15 NOTE — PLAN OF CARE
Goal Outcome Evaluation:  Plan of Care Reviewed With: patient           Outcome Evaluation: PT evaluation complete:  Patient initially observed to appear lethargic and confused but then with prompting becomes more alert and cooperative.  He is able to state his name and that he lives at [facility].  His is aphasic.  Patient follows simple commands.  Patient transfers supine to sit with mod assist a 2.  Sit/stand  with min assist x 2 and is able to take 3 pivot steps to chair with rolling walker and contact guard assist.  PT will continue to see patient to progress mobility, gait, strengthening and to assess for needs upon discharge.    Anticipated Discharge Disposition (PT):  (patient likely to return to [facility] [place]:  will continue to assess for needs upon discharge)

## 2025-03-15 NOTE — PLAN OF CARE
Goal Outcome Evaluation:  Plan of Care Reviewed With: patient        Progress: improving  Outcome Evaluation: PT VSS, continues tele, Afib with pacemaker, HR 55's-70's.  RT titrating 02, 3L regular nasal canula, see RT charting. Pt denies pain, denies n/v/d, tolerating diet. Pt continues fall precautions, up with assist x2, walker; sat in chair today; working with PT, see PT note.  Pt afternoon agitation improved with prn ativan, see nicole, new iv.  Pt resting at this time.  Per report, plan to return to [facility] at discharge, see CM notes.

## 2025-03-15 NOTE — PROGRESS NOTES
"Hospital Medicine Team    LOS 0 days      Patient Care Team:  Debbie Bello APRN as PCP - General (Nurse Practitioner)      Subjective       Chief Complaint:  f/u shortness of breath     Subjective    Still short of breath today but he is a poor historian    Objective       Vital Signs  Temp:  [97.7 °F (36.5 °C)-99.7 °F (37.6 °C)] 97.9 °F (36.6 °C)  Heart Rate:  [] 58  Resp:  [16-30] 22  BP: (126-181)/(49-91) 158/66  Oxygen Therapy  SpO2: 92 %  Pulse Oximetry Type: Continuous  Device (Oxygen Therapy): high-flow nasal cannula, humidified  Device (Oxygen Therapy) (Infant): nasal cannula  Flow (L/min) (Oxygen Therapy): 5  Flowsheet Rows      Flowsheet Row First Filed Value   Admission Height 165.1 cm (65\") Documented at 03/14/2025 1622   Admission Weight 84.4 kg (186 lb) Documented at 03/14/2025 1622                Physical Exam:  Physical Exam  Vitals reviewed.   Cardiovascular:      Rate and Rhythm: Normal rate.   Pulmonary:      Breath sounds: Rhonchi present.   Abdominal:      General: There is no distension.   Musculoskeletal:      Right lower leg: No edema.      Left lower leg: No edema.   Neurological:      Mental Status: Mental status is at baseline.   Psychiatric:         Mood and Affect: Mood normal.           Results Review:    I reviewed the patient's new clinical results.    [x]  Laboratory  [x]  Microbiology  []  Radiology  []  EKG/Telemetry   []  Cardiology/Vascular   []  Pathology  []  Old records  []  Other:      X-rays, labs reviewed personally by physician.    Medication Review:   I have reviewed the patient's current medication list    Scheduled Meds  guaiFENesin, 600 mg, Oral, Q12H  ipratropium-albuterol, 3 mL, Nebulization, 4x Daily - RT  methylPREDNISolone sodium succinate, 40 mg, Intravenous, Q8H  sodium chloride, 10 mL, Intravenous, Q12H        Meds Infusions       Meds PRN    acetaminophen **OR** acetaminophen **OR** acetaminophen    senna-docusate sodium **AND** polyethylene glycol " **AND** bisacodyl **AND** bisacodyl    Calcium Replacement - Follow Nurse / BPA Driven Protocol    diazePAM    ipratropium-albuterol    LORazepam    Magnesium Standard Dose Replacement - Follow Nurse / BPA Driven Protocol    ondansetron ODT **OR** ondansetron    Phosphorus Replacement - Follow Nurse / BPA Driven Protocol    Potassium Replacement - Follow Nurse / BPA Driven Protocol    sodium chloride    sodium chloride        Assessment / Plan       Active Hospital Problems:  Active Hospital Problems    Diagnosis  POA    **COPD (chronic obstructive pulmonary disease) [J44.9]  Yes      Resolved Hospital Problems   No resolved problems to display.       Acute on chronic hypoxic respiratory failure  Likely secondary to acute COPD exacerbation   Wears 2 L oxygen at baseline; on HFNC 5 L now  CXR reviewed; no acute finding  COVID, Flu, RSV, RVP negative  Continue scheduled and prn Duonebs, IV steroids, mucinex  IS/flutter valve   Wean oxygen as tolerated     Atrial fibrillation   With hx of pacemaker placement  Continue home rate control meds and AC with Eliquis      CKD III  Creatinine 1.3; near baseline  Monitor     Hx of CVA   With residual aphasia        Code status:  DNR/DNI    DVT ppx-Home Eliquis      Plan for disposition: Back to [facility] possibly in the next couple of days if oxygenation improves    Electronically signed by Chino Carvalho DO, 03/15/25, 13:39 EDT.      Note disclaimer: At [facility], we believe that sharing information builds trust and better relationships. You are receiving this note because you recently visited [facility]. It is possible you will see health information before a provider has talked with you about it. This kind of information can be easy to misunderstand. To help you fully understand what it means for your health, we urge you to discuss this note with your provider.

## 2025-03-15 NOTE — SIGNIFICANT NOTE
03/15/25 1733   Clinician Notification   Reason for Communication   ([facility] [phone] called again r/t please send pt's med list, fax number provided.)   Clinician Name [facility]  (reports their fax number is [phone], request for meds faxed,  notified this is the 5th request today. house and charge are aware.)   Method of Communication Call

## 2025-03-15 NOTE — H&P
[facility]     PCP: Debbie Bello APRN    CODE status: Full     CHIEF COMPLAINT: SOB    HISTORY OF PRESENT ILLNESS:    75 y/o male with  hx of COPD, chronic hypoxic respiratory failure on 2 L NC, Atrial fibrillation, CKD III, CVA with aphasia, and HTN, presents to [facility] ED for evaluation of SOB.  Patient is unable to provide history, so history comes from ED notes and my discussion with son at bedside.  Patient started having SOB today.  EMS was called and patient was noted to be 80% on 4 L NC.  He was placed on a non-rebreather, and his sats improved to 100%.  In the ED, he was noted to have poor air movement and was given Duoneb treatment and IV steroids.  He was also given Valium and Ativan for leg cramps.  He is too confused to answer any of my questions at this time.        Past Medical History:   Diagnosis Date    Anemia     Anxiety     Aphasia     Arthritis     ASHD (arteriosclerotic heart disease)     Atrial fibrillation, chronic     BPH (benign prostatic hyperplasia)     Cardiac pacemaker     Cognitive communication deficit     COPD (chronic obstructive pulmonary disease)     Depression     Dysphagia     Edema     Emphysema lung     Emphysema lung     Heart failure     High cholesterol     Hypertension     Hypokalemia     PAF (paroxysmal atrial fibrillation)     Pancreatitis     Polyneuropathy     PVD (peripheral vascular disease)     Shortness of breath     Stroke     Thyroid disease      Past Surgical History:   Procedure Laterality Date    CATARACT EXTRACTION      HEMORRHOIDECTOMY      NEPHRECTOMY PARTIAL       Family History   Problem Relation Age of Onset    Stroke Mother      Social History     Tobacco Use    Smoking status: Former     Current packs/day: 1.00     Types: Cigarettes    Smokeless tobacco: Never   Vaping Use    Vaping status: Never Used   Substance Use Topics    Alcohol use: Yes     Alcohol/week: 35.0 standard drinks of alcohol     Types: 35 Cans  "of beer per week     Comment: 5-6 beer per day    Drug use: Never     (Not in a hospital admission)    Allergies:  Prednisone    Immunization History   Administered Date(s) Administered    COVID-19 (PFIZER) BIVALENT 12+YRS 04/06/2023    COVID-19 (PFIZER) Purple Cap Monovalent 02/23/2021, 03/16/2021, 12/06/2021, 06/15/2022    Fluzone (or Fluarix & Flulaval for VFC) >6mos 10/06/2015    Fluzone High-Dose 65+yrs 10/20/2021       REVIEW OF SYSTEMS:  Please see the above history of present illness for pertinent positives and negatives.  Unable to obtain further ROS secondary to patient's confusion.      Vital Signs  Temp:  [98 °F (36.7 °C)-99.1 °F (37.3 °C)] 99.1 °F (37.3 °C)  Heart Rate:  [] 83  Resp:  [16-30] 30  BP: (126-163)/(49-88) 158/85  Flowsheet Rows      Flowsheet Row First Filed Value   Admission Height 165.1 cm (65\") Documented at 03/14/2025 1622   Admission Weight 84.4 kg (186 lb) Documented at 03/14/2025 1622               Physical Exam:  General: Patient is lying in bed; confused   HENT: Head is atraumatic, normocephalic  Eyes: Vision is grossly intact. Pupils appear equal and round.   Cardiovascular: irregularly irregular rhythm; S1-S2  Respiratory: Lungs are diminished at bases; no wheezing   Abdominal/GI: Soft obese, nontender, + BS  Extremities: No peripheral edema noted.   Musculoskeletal: Spontaneous movement of bilateral upper and lower extremities against gravity noted. No signs of injury or deformity noted.   Skin: Warm and dry.   Psych: confused  Neuro: aphasic; moves arms and legs; too confused to follow for full neuro exam        Results Review:    I reviewed the patient's new clinical results.  Lab Results (most recent)       Procedure Component Value Units Date/Time    COVID-19, FLU A/B, RSV PCR 1 HR TAT - Swab, Nasopharynx [[id number]]  (Normal) Collected: 03/14/25 1752    Specimen: Swab from Nasopharynx Updated: 03/14/25 1834     COVID19 Not Detected     Influenza A PCR Not Detected     " Influenza B PCR Not Detected     RSV, PCR Not Detected    Narrative:      Fact sheet for providers: https://www.fda.gov/media/185860/download    Fact sheet for patients: https://www.fda.gov/media/094409/download    Test performed by PCR.    High Sensitivity Troponin T 1Hr [[id number]]  (Abnormal) Collected: 03/14/25 1807    Specimen: Blood Updated: 03/14/25 1827     HS Troponin T 24 ng/L      Troponin T Numeric Delta 0 ng/L      Troponin T % Delta 0    Narrative:      High Sensitive Troponin T Reference Range:  <14.0 ng/L- Negative Female for AMI  <22.0 ng/L- Negative Male for AMI  >=14 - Abnormal Female indicating possible myocardial injury.  >=22 - Abnormal Male indicating possible myocardial injury.   Clinicians would have to utilize clinical acumen, EKG, Troponin, and serial changes to determine if it is an Acute Myocardial Infarction or myocardial injury due to an underlying chronic condition.         Blood Gas, Arterial - [[id number]]  (Abnormal) Collected: 03/14/25 1805    Specimen: Arterial Blood Updated: 03/14/25 1814     Site Right Radial     Kiko's Test Positive     pH, Arterial 7.328 pH units      Comment: 84 Value below reference range        pCO2, Arterial 46.8 mm Hg      Comment: 83 Value above reference range        pO2, Arterial 63.2 mm Hg      Comment: 84 Value below reference range        HCO3, Arterial 24.5 mmol/L      Base Excess, Arterial -1.5 mmol/L      Comment: 84 Value below reference range        O2 Saturation, Arterial 90.8 %      Comment: 84 Value below reference range        Hemoglobin, Blood Gas 9.0 g/dL      Comment: 84 Value below reference range        Temperature 37.0     Barometric Pressure for Blood Gas 733 mmHg      Modality Nasal Cannula     Flow Rate 3.0 lpm      Collected by [id number]     Comment: Meter: [id number]     :  [id number]        pCO2, Temperature Corrected 46.8 mm Hg      pH, Temp Corrected 7.328 pH Units      pO2, Temperature Corrected 63.2 mm Hg      Comprehensive Metabolic Panel [[id number]]  (Abnormal) Collected: 03/14/25 1640    Specimen: Blood Updated: 03/14/25 1704     Glucose 103 mg/dL      BUN 36 mg/dL      Creatinine 1.52 mg/dL      Sodium 136 mmol/L      Potassium 4.5 mmol/L      Chloride 103 mmol/L      CO2 23.0 mmol/L      Calcium 9.4 mg/dL      Total Protein 7.8 g/dL      Albumin 4.2 g/dL      ALT (SGPT) 7 U/L      AST (SGOT) 18 U/L      Alkaline Phosphatase 87 U/L      Total Bilirubin 0.2 mg/dL      Globulin 3.6 gm/dL      A/G Ratio 1.2 g/dL      BUN/Creatinine Ratio 23.7     Anion Gap 10.0 mmol/L      eGFR 47.8 mL/min/1.73     Narrative:      GFR Categories in Chronic Kidney Disease (CKD)      GFR Category          GFR (mL/min/1.73)    Interpretation  G1                     90 or greater         Normal or high (1)  G2                      60-89                Mild decrease (1)  G3a                   45-59                Mild to moderate decrease  G3b                   30-44                Moderate to severe decrease  G4                    15-29                Severe decrease  G5                    14 or less           Kidney failure          (1)In the absence of evidence of kidney disease, neither GFR category G1 or G2 fulfill the criteria for CKD.    eGFR calculation 2021 CKD-EPI creatinine equation, which does not include race as a factor    BNP [[id number]]  (Abnormal) Collected: 03/14/25 1640    Specimen: Blood Updated: 03/14/25 1704     proBNP 1,809.0 pg/mL     Narrative:      This assay is used as an aid in the diagnosis of individuals suspected of having heart failure. It can be used as an aid in the diagnosis of acute decompensated heart failure (ADHF) in patients presenting with signs and symptoms of ADHF to the emergency department (ED). In addition, NT-proBNP of <300 pg/mL indicates ADHF is not likely.    Age Range Result Interpretation  NT-proBNP Concentration (pg/mL:      <50             Positive            >450                   Potter                  300-450                    Negative             <300    50-75           Positive            >900                  Gray                300-900                  Negative            <300      >75             Positive            >1800                  Gray                300-1800                  Negative            <300    High Sensitivity Troponin T [[id number]]  (Abnormal) Collected: 03/14/25 1640    Specimen: Blood Updated: 03/14/25 1704     HS Troponin T 24 ng/L     Narrative:      High Sensitive Troponin T Reference Range:  <14.0 ng/L- Negative Female for AMI  <22.0 ng/L- Negative Male for AMI  >=14 - Abnormal Female indicating possible myocardial injury.  >=22 - Abnormal Male indicating possible myocardial injury.   Clinicians would have to utilize clinical acumen, EKG, Troponin, and serial changes to determine if it is an Acute Myocardial Infarction or myocardial injury due to an underlying chronic condition.         Urinalysis With Microscopic If Indicated (No Culture) - Urine, Clean Catch [[id number]]  (Normal) Collected: 03/14/25 1639    Specimen: Urine, Clean Catch Updated: 03/14/25 1646     Color, UA Yellow     Appearance, UA Clear     pH, UA 5.5     Specific Gravity, UA <=1.005     Glucose, UA Negative     Ketones, UA Negative     Bilirubin, UA Negative     Blood, UA Negative     Protein, UA Negative     Leuk Esterase, UA Negative     Nitrite, UA Negative     Urobilinogen, UA 0.2 E.U./dL    Narrative:      Urine microscopic not indicated.    CBC & Differential [[id number]]  (Abnormal) Collected: 03/14/25 1640    Specimen: Blood Updated: 03/14/25 1646    Narrative:      The following orders were created for panel order CBC & Differential.  Procedure                               Abnormality         Status                     ---------                               -----------         ------                     CBC Auto Differential[[id number]]        Abnormal            Final result                  Please view results for these tests on the individual orders.    CBC Auto Differential [[id number]]  (Abnormal) Collected: 03/14/25 1640    Specimen: Blood Updated: 03/14/25 1645     WBC 11.19 10*3/mm3      RBC 3.04 10*6/mm3      Hemoglobin 8.9 g/dL      Hematocrit 28.6 %      MCV 94.1 fL      MCH 29.3 pg      MCHC 31.1 g/dL      RDW 15.7 %      RDW-SD 53.6 fl      MPV 11.7 fL      Platelets 259 10*3/mm3      Neutrophil % 76.4 %      Lymphocyte % 11.7 %      Monocyte % 8.8 %      Eosinophil % 2.3 %      Basophil % 0.4 %      Immature Grans % 0.4 %      Neutrophils, Absolute 8.54 10*3/mm3      Lymphocytes, Absolute 1.31 10*3/mm3      Monocytes, Absolute 0.99 10*3/mm3      Eosinophils, Absolute 0.26 10*3/mm3      Basophils, Absolute 0.04 10*3/mm3      Immature Grans, Absolute 0.05 10*3/mm3      nRBC 0.0 /100 WBC             Imaging Results (Most Recent)       Procedure Component Value Units Date/Time    XR Chest AP [[id number]] Collected: 03/14/25 1740     Updated: 03/14/25 1754    Narrative:      XR CHEST AP    Date of Exam: 3/14/2025 5:19 PM EDT    Indication: COVID Evaluation, Cough, Fever    Comparison: November 28, 2023    Findings:  The heart looks enlarged. There is haziness in the right basilar area that may reflect more of a confluence of shadows. There is slight prominence of markings within the interstitium of the lungs which could relate to technique.      Impression:      Impression:  1.Cardiomegaly.  2.Slight prominence of markings within the interstitium of the lungs which could relate to technique.  3.Haziness right lower lobe that probably reflects summation of shadows.        Electronically Signed: Mauro Kilgore MD    3/14/2025 5:51 PM EDT    Workstation ID: [id number]          reviewed    ECG/EMG Results (most recent)       Procedure Component Value Units Date/Time    ECG 12 Lead Dyspnea [[id number]] Collected: 03/14/25 1650     Updated: 03/14/25 1651     QT Interval 393 ms      QTC Interval 432 ms      Narrative:      HEART RATE=72  bpm  RR Keqipvav=401  ms  CO Tdygdxfu=592  ms  P Horizontal Axis=  deg  P Front Axis=0  deg  QRSD Oyzuencr=151  ms  QT Klunkyxs=431  ms  GOsX=612  ms  QRS Axis=105  deg  T Wave Axis=67  deg  - ABNORMAL ECG -  Sinus rhythm  Atrial premature complex  Nonspecific intraventricular conduction delay  Probable lateral infarct, old  Date and Time of Study:2025-03-14 16:50:07    ECG 12 Lead [[id number]] Resulted: 03/14/25 1936     Updated: 03/14/25 1936          reviewed    Assessment & Plan     Acute on chronic hypoxic respiratory failure  Likely secondary to acute COPD exacerbation   Wears 2 L oxygen at baseline; up to 3 L currently   CXR reviewed; no acute finding  COVID, Flu, RSV negative; check full RVP  Start scheduled and prn Duonebs, IV steroids, mucinex  IS/flutter valve   Wean oxygen as tolerated    Atrial fibrillation   With hx of pacemaker placement  Continue home rate control meds once reconciled and AC with Eliquis     CKD III  Creatinine 1.6; near baseline  Monitor    Hx of CVA   With residual aphasia    DVT ppx:  Eliquis    Code status:  DNR/DNI                  Saad Tovar DO  03/14/25  20:54 EDT        At [facility], we believe that sharing information builds trust and better relationships. You are receiving this note because you recently visited [facility]. It is possible you will see health information before a provider has talked with you about it. This kind of information can be easy to misunderstand. To help you fully understand what it means for your health, we urge you to discuss this note with your provider.

## 2025-03-15 NOTE — SIGNIFICANT NOTE
03/15/25 1320   Clinician Notification   Reason for Communication   (Cricket spoke to Lacie on [address]  [place], to get pt's med list faxed over.)   Clinician Name [facility]   Method of Communication Call

## 2025-03-15 NOTE — THERAPY EVALUATION
Acute Care - Physical Therapy Initial Evaluation   [facility]     Patient Name: Jose Manuel Wray  : 1950  MRN: [id number]  Today's Date: 3/15/2025   Onset of Illness/Injury or Date of Surgery: 25  Visit Dx:     ICD-10-CM ICD-9-CM   1. COPD exacerbation  J44.1 491.21   2. Groin rash  R21 782.1   3. Leg muscle spasm  M62.838 728.85     Patient Active Problem List   Diagnosis    Chronic pancreatitis    Alcohol intoxication    Fall    Hyponatremia    Laceration of scalp without foreign body    Wheezing    Atrial fibrillation    Mixed hyperlipidemia    Essential hypertension    Other emphysema    Fibula fracture    COPD (chronic obstructive pulmonary disease)     Past Medical History:   Diagnosis Date    Anemia     Anxiety     Aphasia     Arthritis     ASHD (arteriosclerotic heart disease)     Atrial fibrillation, chronic     BPH (benign prostatic hyperplasia)     Cardiac pacemaker     Cognitive communication deficit     COPD (chronic obstructive pulmonary disease)     Depression     Dysphagia     Edema     Emphysema lung     Emphysema lung     Heart failure     High cholesterol     Hypertension     Hypokalemia     Neuropathy     Pacemaker     PAF (paroxysmal atrial fibrillation)     Pancreatitis     Polyneuropathy     PVD (peripheral vascular disease)     Shortness of breath     Stroke     Thyroid disease      Past Surgical History:   Procedure Laterality Date    ANKLE ARTHROPLASTY Bilateral     CATARACT EXTRACTION      FEMORAL POPLITEAL BYPASS      HEMORRHOIDECTOMY      NEPHRECTOMY PARTIAL      TOE AMPUTATION Right     great toe    TOE AMPUTATION      all four toes traumatically removed in 20's    VENTRICULAR CARDIAC PACEMAKER INSERTION       PT Assessment (Last 12 Hours)       PT Evaluation and Treatment       Row Name 03/15/25 0900          Physical Therapy Time and Intention    Subjective Information complains of;weakness  -SP     Document Type evaluation  -SP     Mode of Treatment physical therapy   -SP     Patient Effort adequate  -SP     Symptoms Noted During/After Treatment fatigue  -SP       Row Name 03/15/25 0900          General Information    Patient Profile Reviewed yes  -SP     Onset of Illness/Injury or Date of Surgery 03/14/25  -SP     Referring Physician Guy  -SP     Patient Observations cooperative;agree to therapy  -SP     General Observations of Patient Patient initially observed to appear confused and removing his clothing.  Later patient appears lethargic but with prompting, becomes more alert and cooperative  -SP     Prior Level of Function --  per chart and patient's nurse, patient is a long term resident at [facility] and is able to walk short distances with walker and did participate in PT.  Patient reports that he does use a walker  -SP     Equipment Currently Used at Home walker, rolling  -SP     Pertinent History of Current Functional Problem Patient admitted via ED with dyspnea.  PMH: COPD, chronic hypoxic resp failure on 2 LO2 NC, Afib, CKDIII, and history of CVA with aphasia.  Patient diagnosed with acute on chronic hypoxic resp. failure.  -SP     Risks Reviewed patient:;LOB;increased discomfort;dizziness;change in vital signs  -SP     Benefits Reviewed patient:;improve function;increase independence;increase strength  -SP     Barriers to Rehab medically complex  -SP       Row Name 03/15/25 0900          Living Environment    Current Living Arrangements residential facility  -SP     Home Accessibility wheelchair accessible  -SP     People in Home facility resident  -SP       Row Name 03/15/25 0900          Home Use of Assistive/Adaptive Equipment    Equipment Currently Used at Home oxygen;walker, rolling  -SP       Row Name 03/15/25 0900          Pain    Pain Side/Orientation lower;bilateral  -SP     Additional Documentation --  patient indicated bilateral LE pain but does not give pain rating  -SP       Row Name 03/15/25 0900          Cognition    Affect/Mental Status  (Cognition) --  initally patient appeared confused, but with prompting becomes more alert and cooperative:  Patient with apshasia and therapist has difficulty understanding speech, he is able to to state his name and that he lives at [facility] and uses a rolling walker  -SP     Orientation Status (Cognition) oriented to;person  -SP     Follows Commands (Cognition) follows one-step commands  -SP       Row Name 03/15/25 0900          Range of Motion (ROM)    Range of Motion bilateral lower extremities  grossly WFL  -SP       Row Name 03/15/25 0900          Strength (Manual Muscle Testing)    Strength (Manual Muscle Testing) bilateral lower extremities  grossly 4-/5 throughout  -SP       Row Name 03/15/25 0900          Bed Mobility    Bed Mobility supine-sit  -SP     Supine-Sit [place] (Bed Mobility) moderate assist (50% patient effort);2 person assist  -SP     Bed Mobility, Safety Issues decreased use of arms for pushing/pulling;decreased use of legs for bridging/pushing  -SP     Assistive Device (Bed Mobility) head of bed elevated;bed rails  -SP       Row Name 03/15/25 0900          Transfers    Transfers sit-stand transfer;stand-sit transfer  -SP     Comment, (Transfers) verbal cues for hand placement  -SP       Row Name 03/15/25 0900          Sit-Stand Transfer    Sit-Stand [place] (Transfers) minimum assist (75% patient effort);2 person assist;verbal cues  -SP     Assistive Device (Sit-Stand Transfers) walker, front-wheeled  -SP       Row Name 03/15/25 0900          Stand-Sit Transfer    Stand-Sit [place] (Transfers) minimum assist (75% patient effort);verbal cues;2 person assist  -SP     Assistive Device (Stand-Sit Transfers) walker, front-wheeled  -SP       Row Name 03/15/25 0900          Gait/Stairs (Locomotion)    [place] Level (Gait) verbal cues;contact guard  -SP     Assistive Device (Gait) walker, front-wheeled  -SP     Distance in Feet (Gait) --  3 pivot steps to chair placed near  bedside  -SP     Pattern (Gait) step-to  -SP     Deviations/Abnormal Patterns (Gait) antalgic  -SP       Row Name             Wound 03/14/25 2120 Right lower leg Other (Comments)    Wound - Properties Group Placement Date: 03/14/25  -AB Placement Time: 2120 -AB Present on Original Admission: Y  -AB Side: Right  -AB Orientation: lower  -AB Location: leg  -AB Primary Wound Type: Other  -AB Secondary Wound Type - Other: --  -AB, Blisters     Retired Wound - Properties Group Placement Date: 03/14/25  -AB Placement Time: 2120 -AB Present on Original Admission: Y  -AB Side: Right  -AB Orientation: lower  -AB Location: leg  -AB    Retired Wound - Properties Group Placement Date: 03/14/25  -AB Placement Time: 2120 -AB Present on Original Admission: Y  -AB Side: Right  -AB Orientation: lower  -AB Location: leg  -AB    Retired Wound - Properties Group Date first assessed: 03/14/25 -AB Time first assessed: 2120 -AB Present on Original Admission: Y  -AB Side: Right  -AB Location: leg  -AB      Row Name 03/15/25 0900          Plan of Care Review    Plan of Care Reviewed With patient  -SP     Outcome Evaluation PT evaluation complete:  Patient initially observed to appear lethargic and confused but then with prompting becomes more alert and cooperative.  He is able to state his name and that he lives at [facility].  His is aphasic.  Patient follows simple commands.  Patient transfers supine to sit with mod assist a 2.  Sit/stand  with min assist x 2 and is able to take 3 pivot steps to chair with rolling walker and contact guard assist.  PT will continue to see patient to progress mobility, gait, strengthening and to assess for needs upon discharge.  -SP       Row Name 03/15/25 0900          Positioning and Restraints    Pre-Treatment Position in bed  -SP     Post Treatment Position chair  -SP     In Chair reclined;call light within reach;encouraged to call for assist;exit alarm on;legs elevated  -SP       Row Name 03/15/25  0900          Therapy Assessment/Plan (PT)    Rehab Potential (PT) good  -SP     Therapy Frequency (PT) 6 times/wk  -SP     Predicted Duration of Therapy Intervention (PT) 2-6 days or until discharge  -SP       Row Name 03/15/25 0900          Therapy Plan Review/Discharge Plan (PT)    Therapy Plan Review (PT) evaluation/treatment results reviewed;care plan/treatment goals reviewed;risks/benefits reviewed;current/potential barriers reviewed;participants voiced agreement with care plan;patient  -SP       Row Name 03/15/25 0900          Physical Therapy Goals    Bed Mobility Goal Selection (PT) bed mobility, PT goal 1  -SP     Transfer Goal Selection (PT) transfer, PT goal 1  -SP     Gait Training Goal Selection (PT) gait training, PT goal 1  -SP       Row Name 03/15/25 0900          Bed Mobility Goal 1 (PT)    Activity/Assistive Device (Bed Mobility Goal 1, PT) sit to supine;supine to sit  -SP     [place] Level/Cues Needed (Bed Mobility Goal 1, PT) standby assist  -SP     Time Frame (Bed Mobility Goal 1, PT) 5 days  -SP     Progress/Outcomes (Bed Mobility Goal 1, PT) new goal  -SP       Row Name 03/15/25 0900          Transfer Goal 1 (PT)    Activity/Assistive Device (Transfer Goal 1, PT) sit-to-stand/stand-to-sit;bed-to-chair/chair-to-bed  -SP     [place] Level/Cues Needed (Transfer Goal 1, PT) contact guard required  -SP     Time Frame (Transfer Goal 1, PT) 5 days  -SP     Progress/Outcome (Transfer Goal 1, PT) new goal  -SP       Row Name 03/15/25 0900          Gait Training Goal 1 (PT)    Activity/Assistive Device (Gait Training Goal 1, PT) gait (walking locomotion);assistive device use;walker, rolling  -SP     [place] Level (Gait Training Goal 1, PT) contact guard required  -SP     Distance (Gait Training Goal 1, PT) 15 feet  -SP     Time Frame (Gait Training Goal 1, PT) 5 days  -SP     Progress/Outcome (Gait Training Goal 1, PT) new goal  -SP               User Key  (r) = Recorded By, (t) = Taken  By, (c) = Cosigned By      Initials Name Provider Type    SP Jeannette Rodgers, PT Physical Therapist    Filomena Pennington RN Registered Nurse                    Physical Therapy Education       Title: PT OT SLP Therapies (Done)       Topic: Physical Therapy (Done)       Point: Mobility training (Done)       Learning Progress Summary            Patient Acceptance, E, VU,NR by SP at 3/15/2025 1151    Comment: safety with transfers and gait                                      User Key       Initials Effective Dates Name Provider Type Discipline    SP 07/11/23 -  Jeannette Rodgers, PT Physical Therapist PT                  PT Recommendation and Plan  Anticipated Discharge Disposition (PT):  (patient likely to return to [place]:  will continue to assess for needs upon discharge)  Therapy Frequency (PT): 6 times/wk  Plan of Care Reviewed With: patient  Outcome Evaluation: PT evaluation complete:  Patient initially observed to appear lethargic and confused but then with prompting becomes more alert and cooperative.  He is able to state his name and that he lives at [facility].  His is aphasic.  Patient follows simple commands.  Patient transfers supine to sit with mod assist a 2.  Sit/stand  with min assist x 2 and is able to take 3 pivot steps to chair with rolling walker and contact guard assist.  PT will continue to see patient to progress mobility, gait, strengthening and to assess for needs upon discharge.   Outcome Measures       Row Name 03/15/25 1100             How much help from another person do you currently need...    Turning from your back to your side while in flat bed without using bedrails? 2  -SP      Moving from lying on back to sitting on the side of a flat bed without bedrails? 2  -SP      Moving to and from a bed to a chair (including a wheelchair)? 3  -SP      Standing up from a chair using your arms (e.g., wheelchair, bedside chair)? 3  -SP      Climbing 3-5 steps with a  railing? 1  -SP      To walk in hospital room? 2  -SP      AM-PAC 6 Clicks Score (PT) 13  -SP         Functional Assessment    Outcome Measure Options AM-PAC 6 Clicks Basic Mobility (PT)  -SP                User Key  (r) = Recorded By, (t) = Taken By, (c) = Cosigned By      Initials Name Provider Type    Jeannette Coelho, PT Physical Therapist                     Time Calculation:    PT Charges       Row Name 03/15/25 1152             Time Calculation    Start Time 0900  -SP      Stop Time 0920  -SP      Time Calculation (min) 20 min  -SP                User Key  (r) = Recorded By, (t) = Taken By, (c) = Cosigned By      Initials Name Provider Type    Jeannette Coelho, PT Physical Therapist                  Therapy Charges for Today       Code Description Service Date Service Provider Modifiers Qty    [id number] HC PT EVAL MOD COMPLEXITY 1 3/15/2025 Jeannette Rodgers, PT GP 1            PT G-Codes  Outcome Measure Options: AM-PAC 6 Clicks Basic Mobility (PT)  AM-PAC 6 Clicks Score (PT): 13    Jeannette Rodgers, PT  3/15/2025

## 2025-03-15 NOTE — NURSING NOTE
Notified Dr. Tovar of patient oxygen requirements increasing. New order noted for high flow. No further orders at this time.

## 2025-03-15 NOTE — CASE MANAGEMENT/SOCIAL WORK
Continued Stay Note  [facility]     Patient Name: Jose Manuel Wray  MRN: [id number]  Today's Date: 3/15/2025    Admit Date: 3/14/2025    Plan: Return to [facility]   Discharge Plan    Call to [facility] and confirmed pt is LTC and has a bedhold, pt can return whenever medically stable. Spoke with pt's son/MARY Matthews, reviewed IMM, copy given and verbalized understanding. Confirmed they would like pt to return to [facility]. CCP will follow.                   Discharge Codes    No documentation.                       Pepe Jacobsen RN

## 2025-03-15 NOTE — PLAN OF CARE
Goal Outcome Evaluation:  Plan of Care Reviewed With: patient        Progress: no change  Outcome Evaluation: Patient admitted from ER for COPD exacerbation. Oxygen demand increased throughout the night, now tolerating high flow nasal cannula. No complaints of signs and symptoms of pain. Patient does continue to have leg spasms, patient POA states he occasionally has them at the facility also. VSS remain stable. Remains afib on telemetry. Rested off and on throughtout the night.

## 2025-03-15 NOTE — ED NOTES
Pt wanted to sit in a chair as it tends to help his leg cramps. Pt was assisted x 2 up to chair and then became very agitated and started yelling and cussing at staff.Pt told us to leave but due to patients condition we are not able to do that which made him more agitated. Pt una his hand to me like he was going to strike me. Dr. Barker was notified of this occurrence and order received for Ativan

## 2025-03-15 NOTE — SIGNIFICANT NOTE
03/15/25 1144   Clinician Notification   Reason for Communication   (Called [facility] again for pt's home med list. [phone].  Still no fax recieved. Charge and House in unit aware.)   Clinician Name [facility]   Method of Communication Call

## 2025-03-15 NOTE — ED NOTES
Pt still very restless and c/o bad leg cramps. Son-in-law at bedside requesting more meds to treat

## 2025-03-15 NOTE — DISCHARGE PLACEMENT REQUEST
"Marvin Wray (74 y.o. Male)       Date of Birth   1950    Social Security Number       Address   [address] [place] [address]    Home Phone   [phone]    MRN   [id number]       [facility]   None    Marital Status   Single                            Admission Date   3/14/2025    Admission Type   Emergency    Admitting Provider   Saad Tovar DO    Attending Provider   Saad Tovar DO    Department, Room/Bed   [facility] MED SURG, 1410/1       Discharge Date       Discharge Disposition       Discharge Destination                                 Attending Provider: Saad Tovar DO    Allergies: Prednisone    Isolation: Enh Drop/Con   Infection: None   Code Status: No CPR    Ht: 165.1 cm (65\")   Wt: 85.2 kg (187 lb 13.3 oz)    Admission Cmt: None   Principal Problem: COPD (chronic obstructive pulmonary disease) [J44.9]                   Active Insurance as of 3/14/2025       Primary Coverage       Payor Plan Insurance Group Employer/Plan Group    MEDICARE MEDICARE A & B        Payor Plan Address Payor Plan Phone Number Payor Plan Fax Number Effective Dates    [address] [phone]  7/1/2008 - None Entered    [facility] [id number]         Subscriber Name Subscriber Birth Date Member ID       MARVIN WRAY 1950 [id number]               Secondary Coverage       Payor Plan Insurance Group Employer/Plan Group    [place]        Payor Plan Address Payor Plan Phone Number Payor Plan Fax Number Effective Dates    [address] [phone]  11/1/2023 - None Entered    [place] [address]         Subscriber Name Subscriber Birth Date Member ID       MARVIN WRAY 1950 [id number]                     Emergency Contacts        (Rel.) Home Phone Work Phone Mobile Phone    April Kasper (Daughter) [phone] -- --    TONO KASPER (Son) -- -- [phone]    john pisano (Brother) -- -- --                "

## 2025-03-16 VITALS
SYSTOLIC BLOOD PRESSURE: 154 MMHG | BODY MASS INDEX: 31.29 KG/M2 | WEIGHT: 187.83 LBS | TEMPERATURE: 97.4 F | HEIGHT: 65 IN | OXYGEN SATURATION: 93 % | RESPIRATION RATE: 20 BRPM | DIASTOLIC BLOOD PRESSURE: 77 MMHG | HEART RATE: 78 BPM

## 2025-03-16 LAB
ANION GAP SERPL CALCULATED.3IONS-SCNC: 8.8 MMOL/L (ref 5–15)
BASOPHILS # BLD AUTO: 0.01 10*3/MM3 (ref 0–0.2)
BASOPHILS NFR BLD AUTO: 0.1 % (ref 0–1.5)
BUN SERPL-MCNC: 33 MG/DL (ref 8–23)
BUN/CREAT SERPL: 29.7 (ref 7–25)
CALCIUM SPEC-SCNC: 9.3 MG/DL (ref 8.6–10.5)
CHLORIDE SERPL-SCNC: 109 MMOL/L (ref 98–107)
CO2 SERPL-SCNC: 26.2 MMOL/L (ref 22–29)
CREAT SERPL-MCNC: 1.11 MG/DL (ref 0.76–1.27)
DEPRECATED RDW RBC AUTO: 56.3 FL (ref 37–54)
EGFRCR SERPLBLD CKD-EPI 2021: 69.7 ML/MIN/1.73
EOSINOPHIL # BLD AUTO: 0 10*3/MM3 (ref 0–0.4)
EOSINOPHIL NFR BLD AUTO: 0 % (ref 0.3–6.2)
ERYTHROCYTE [DISTWIDTH] IN BLOOD BY AUTOMATED COUNT: 15.8 % (ref 12.3–15.4)
GLUCOSE SERPL-MCNC: 124 MG/DL (ref 65–99)
HCT VFR BLD AUTO: 29.2 % (ref 37.5–51)
HGB BLD-MCNC: 8.8 G/DL (ref 13–17.7)
IMM GRANULOCYTES # BLD AUTO: 0.09 10*3/MM3 (ref 0–0.05)
IMM GRANULOCYTES NFR BLD AUTO: 0.6 % (ref 0–0.5)
LYMPHOCYTES # BLD AUTO: 0.8 10*3/MM3 (ref 0.7–3.1)
LYMPHOCYTES NFR BLD AUTO: 5.6 % (ref 19.6–45.3)
MCH RBC QN AUTO: 29.5 PG (ref 26.6–33)
MCHC RBC AUTO-ENTMCNC: 30.1 G/DL (ref 31.5–35.7)
MCV RBC AUTO: 98 FL (ref 79–97)
MONOCYTES # BLD AUTO: 0.67 10*3/MM3 (ref 0.1–0.9)
MONOCYTES NFR BLD AUTO: 4.7 % (ref 5–12)
NEUTROPHILS NFR BLD AUTO: 12.81 10*3/MM3 (ref 1.7–7)
NEUTROPHILS NFR BLD AUTO: 89 % (ref 42.7–76)
NRBC BLD AUTO-RTO: 0 /100 WBC (ref 0–0.2)
PLATELET # BLD AUTO: 249 10*3/MM3 (ref 140–450)
PMV BLD AUTO: 11.7 FL (ref 6–12)
POTASSIUM SERPL-SCNC: 5.1 MMOL/L (ref 3.5–5.2)
RBC # BLD AUTO: 2.98 10*6/MM3 (ref 4.14–5.8)
SODIUM SERPL-SCNC: 144 MMOL/L (ref 136–145)
WBC NRBC COR # BLD AUTO: 14.38 10*3/MM3 (ref 3.4–10.8)

## 2025-03-16 PROCEDURE — 85025 COMPLETE CBC W/AUTO DIFF WBC: CPT | Performed by: FAMILY MEDICINE

## 2025-03-16 PROCEDURE — 80048 BASIC METABOLIC PNL TOTAL CA: CPT | Performed by: FAMILY MEDICINE

## 2025-03-16 PROCEDURE — 25010000002 LORAZEPAM PER 2 MG: Performed by: FAMILY MEDICINE

## 2025-03-16 PROCEDURE — 94799 UNLISTED PULMONARY SVC/PX: CPT

## 2025-03-16 PROCEDURE — 99239 HOSP IP/OBS DSCHRG MGMT >30: CPT | Performed by: INTERNAL MEDICINE

## 2025-03-16 PROCEDURE — 94664 DEMO&/EVAL PT USE INHALER: CPT

## 2025-03-16 PROCEDURE — 25010000002 METHYLPREDNISOLONE PER 40 MG: Performed by: FAMILY MEDICINE

## 2025-03-16 RX ORDER — GUAIFENESIN 600 MG/1
600 TABLET, EXTENDED RELEASE ORAL EVERY 12 HOURS SCHEDULED
Start: 2025-03-16

## 2025-03-16 RX ORDER — PREDNISONE 20 MG/1
40 TABLET ORAL DAILY
Start: 2025-03-16 | End: 2025-03-21

## 2025-03-16 RX ADMIN — DIAZEPAM 2.5 MG: 5 TABLET ORAL at 03:18

## 2025-03-16 RX ADMIN — GUAIFENESIN 600 MG: 600 TABLET ORAL at 08:19

## 2025-03-16 RX ADMIN — METOPROLOL TARTRATE 25 MG: 25 TABLET, FILM COATED ORAL at 08:19

## 2025-03-16 RX ADMIN — Medication 10 ML: at 08:19

## 2025-03-16 RX ADMIN — IPRATROPIUM BROMIDE AND ALBUTEROL SULFATE 3 ML: .5; 3 SOLUTION RESPIRATORY (INHALATION) at 07:33

## 2025-03-16 RX ADMIN — APIXABAN 5 MG: 2.5 TABLET, FILM COATED ORAL at 08:19

## 2025-03-16 RX ADMIN — METHYLPREDNISOLONE SODIUM SUCCINATE 40 MG: 40 INJECTION, POWDER, FOR SOLUTION INTRAMUSCULAR; INTRAVENOUS at 06:17

## 2025-03-16 RX ADMIN — LORAZEPAM 1 MG: 2 INJECTION INTRAMUSCULAR; INTRAVENOUS at 00:09

## 2025-03-16 NOTE — NURSING NOTE
Nursing IP/EMS Transfer  Jose Manuel Wray  74 y.o.  male    HPI :   Chief Complaint   Patient presents with    Shortness of Breath     Pt comes from nursing home. NH staff told ems they found him to be hypoxic in the 80s, put pt on 2 liters, high 80's, 4 liters 95%. No further report was given by NH staff       Admitting doctor:   Saad Tovar DO    Admitting diagnosis:   The primary encounter diagnosis was COPD exacerbation. Diagnoses of Groin rash and Leg muscle spasm were also pertinent to this visit.    Code status:   Current Code Status       Date Active Code Status Order ID Comments User Context       3/14/2025 2307 No CPR (Do Not Attempt to Resuscitate) [id number]  Saad Tovar DO Inpatient        Question Answer    Code Status (Patient has no pulse and is not breathing) No CPR (Do Not Attempt to Resuscitate)    Medical Interventions (Patient has pulse or is breathing) Full Support    Comments DNR/DNI                    Allergies:   Prednisone    Isolation:   No active isolations    Intake and Output    Intake/Output Summary (Last 24 hours) at 3/16/2025 1232  Last data filed at 3/16/2025 1219  Gross per 24 hour   Intake 180 ml   Output 1450 ml   Net -1270 ml       Weight:       03/14/25  2137   Weight: 85.2 kg (187 lb 13.3 oz)       Most recent vitals:   Vitals:    03/16/25 0745 03/16/25 0819 03/16/25 1124 03/16/25 1202   BP:   180/74 154/77   BP Location:   Left arm Left arm   Patient Position:   Lying Sitting   Pulse: 67  77 78   Resp: 20  20 20   Temp:   97.4 °F (36.3 °C)    TempSrc:   Oral    SpO2:  92% 98% 93%   Weight:       Height:           Active LDAs/IV Access:   Lines, Drains & Airways       Active LDAs       None                    Labs (abnormal labs have a star):   Lab Results (last 24 hours)       Procedure Component Value Units Date/Time    Basic Metabolic Panel [[id number]]  (Abnormal) Collected: 03/16/25 0427    Specimen: Blood Updated: 03/16/25 0502     Glucose 124 mg/dL      BUN 33  mg/dL      Creatinine 1.11 mg/dL      Sodium 144 mmol/L      Potassium 5.1 mmol/L      Chloride 109 mmol/L      CO2 26.2 mmol/L      Calcium 9.3 mg/dL      BUN/Creatinine Ratio 29.7     Anion Gap 8.8 mmol/L      eGFR 69.7 mL/min/1.73     Narrative:      GFR Categories in Chronic Kidney Disease (CKD)      GFR Category          GFR (mL/min/1.73)    Interpretation  G1                     90 or greater         Normal or high (1)  G2                      60-89                Mild decrease (1)  G3a                   45-59                Mild to moderate decrease  G3b                   30-44                Moderate to severe decrease  G4                    15-29                Severe decrease  G5                    14 or less           Kidney failure          (1)In the absence of evidence of kidney disease, neither GFR category G1 or G2 fulfill the criteria for CKD.    eGFR calculation 2021 CKD-EPI creatinine equation, which does not include race as a factor    CBC & Differential [[id number]]  (Abnormal) Collected: 03/16/25 0427    Specimen: Blood Updated: 03/16/25 0445    Narrative:      The following orders were created for panel order CBC & Differential.  Procedure                               Abnormality         Status                     ---------                               -----------         ------                     CBC Auto Differential[[id number]]        Abnormal            Final result                 Please view results for these tests on the individual orders.    CBC Auto Differential [[id number]]  (Abnormal) Collected: 03/16/25 0427    Specimen: Blood Updated: 03/16/25 0445     WBC 14.38 10*3/mm3      RBC 2.98 10*6/mm3      Hemoglobin 8.8 g/dL      Hematocrit 29.2 %      MCV 98.0 fL      MCH 29.5 pg      MCHC 30.1 g/dL      RDW 15.8 %      RDW-SD 56.3 fl      MPV 11.7 fL      Platelets 249 10*3/mm3      Neutrophil % 89.0 %      Lymphocyte % 5.6 %      Monocyte % 4.7 %      Eosinophil % 0.0 %      Basophil %  0.1 %      Immature Grans % 0.6 %      Neutrophils, Absolute 12.81 10*3/mm3      Lymphocytes, Absolute 0.80 10*3/mm3      Monocytes, Absolute 0.67 10*3/mm3      Eosinophils, Absolute 0.00 10*3/mm3      Basophils, Absolute 0.01 10*3/mm3      Immature Grans, Absolute 0.09 10*3/mm3      nRBC 0.0 /100 WBC     Respiratory Panel PCR w/COVID-19(SARS-CoV-2) LATASHA/ESTUARDO/ZAINAB/PAD/COR/ANDRES In-House, NP Swab in UTM/VTM, 2 HR TAT - Swab, Nasopharynx [[id number]]  (Normal) Collected: 03/15/25 0013    Specimen: Swab from Nasopharynx Updated: 03/15/25 1300     ADENOVIRUS, PCR Not Detected     Coronavirus 229E Not Detected     Coronavirus HKU1 Not Detected     Coronavirus NL63 Not Detected     Coronavirus OC43 Not Detected     COVID19 Not Detected     Human Metapneumovirus Not Detected     Human Rhinovirus/Enterovirus Not Detected     Influenza A PCR Not Detected     Influenza B PCR Not Detected     Parainfluenza Virus 1 Not Detected     Parainfluenza Virus 2 Not Detected     Parainfluenza Virus 3 Not Detected     Parainfluenza Virus 4 Not Detected     RSV, PCR Not Detected     Bordetella pertussis pcr Not Detected     Bordetella parapertussis PCR Not Detected     Chlamydophila pneumoniae PCR Not Detected     Mycoplasma pneumo by PCR Not Detected    Narrative:      In the setting of a positive respiratory panel with a viral infection PLUS a negative procalcitonin without other underlying concern for bacterial infection, consider observing off antibiotics or discontinuation of antibiotics and continue supportive care. If the respiratory panel is positive for atypical bacterial infection (Bordetella pertussis, Chlamydophila pneumoniae, or Mycoplasma pneumoniae), consider antibiotic de-escalation to target atypical bacterial infection.             EKG:   ECG 12 Lead   ED Interpretation   Carole Oquendo PA-C     3/14/2025  7:36 PM   ECG 12 Lead         Date/Time: 3/14/2025 4:50 PM      Performed by: Carole Oquendo PA-C   Authorized by: Lucero  Dewey COATS MD  Interpreted by ED physician   Rhythm: sinus rhythm   Ectopy: atrial premature contractions   Rate: normal   BPM: 72   Clinical impression: non-specific ECG   Comments: QTc 432, normal axis      ECG 12 Lead Dyspnea   Final Result   HEART RATE=72  bpm   RR Ffqtisbr=845  ms   AZ Oyilknky=690  ms   P Horizontal Axis=  deg   P Front Axis=Invalid  deg   QRSD Ccsaepme=759  ms   QT Lqnjiakt=315  ms   BVlU=489  ms   QRS Axis=105  deg   T Wave Axis=67  deg   - ABNORMAL ECG -   Atrial fibrillation   Nonspecific  intraventricular conduction delay   Probable  lateral infarct, old   When compared with ECG of 28-Nov-2023 11:23:16,   No significant change   Electronically Signed By: Ron Cope ([facility]) 2025-03-14 22:24:18   Date and Time of Study:2025-03-14 16:50:07          Current Medications:     Current Facility-Administered Medications:     acetaminophen (TYLENOL) tablet 650 mg, 650 mg, Oral, Q4H PRN **OR** acetaminophen (TYLENOL) 160 MG/5ML oral solution 650 mg, 650 mg, Oral, Q4H PRN **OR** acetaminophen (TYLENOL) suppository 650 mg, 650 mg, Rectal, Q4H PRN, Saad Tovar DO    apixaban (ELIQUIS) tablet 5 mg, 5 mg, Oral, Q12H, Chino Carvalho DO, 5 mg at 03/16/25 0819    sennosides-docusate (PERICOLACE) 8.6-50 MG per tablet 2 tablet, 2 tablet, Oral, BID PRN **AND** polyethylene glycol (MIRALAX) packet 17 g, 17 g, Oral, Daily PRN **AND** bisacodyl (DULCOLAX) EC tablet 5 mg, 5 mg, Oral, Daily PRN **AND** bisacodyl (DULCOLAX) suppository 10 mg, 10 mg, Rectal, Daily PRN, Saad Tovar DO    Calcium Replacement - Follow Nurse / BPA Driven Protocol, , Not Applicable, PRN, Saad Tovar DO    diazePAM (VALIUM) tablet 2.5 mg, 2.5 mg, Oral, Q6H PRN, Carole Oquendo PA-C, 2.5 mg at 03/16/25 0318    guaiFENesin (MUCINEX) 12 hr tablet 600 mg, 600 mg, Oral, Q12H, Saad Tovar DO, 600 mg at 03/16/25 0819    ipratropium-albuterol (DUO-NEB) nebulizer solution 3 mL, 3 mL, Nebulization, Q4H PRN, Saad Tovar,      ipratropium-albuterol (DUO-NEB) nebulizer solution 3 mL, 3 mL, Nebulization, 4x Daily - RT, Saad Tovar DO, 3 mL at 03/16/25 0733    LORazepam (ATIVAN) injection 1 mg, 1 mg, Intravenous, Q6H PRN, Saad Tovar DO, 1 mg at 03/16/25 0009    Magnesium Standard Dose Replacement - Follow Nurse / BPA Driven Protocol, , Not Applicable, PRN, Saad Tovar DO    methylPREDNISolone sodium succinate (SOLU-Medrol) injection 40 mg, 40 mg, Intravenous, Q8H, Saad Tovar DO, 40 mg at 03/16/25 0617    metoprolol tartrate (LOPRESSOR) tablet 25 mg, 25 mg, Oral, Q12H, Chino Carvalho DO, 25 mg at 03/16/25 0819    ondansetron ODT (ZOFRAN-ODT) disintegrating tablet 4 mg, 4 mg, Oral, Q6H PRN **OR** ondansetron (ZOFRAN) injection 4 mg, 4 mg, Intravenous, Q6H PRN, Saad Tovar DO    Phosphorus Replacement - Follow Nurse / BPA Driven Protocol, , Not Applicable, PRN, Saad Tovar DO    Potassium Replacement - Follow Nurse / BPA Driven Protocol, , Not Applicable, PRNGuy Paul A, DO    sodium chloride 0.9 % flush 10 mL, 10 mL, Intravenous, Q12H, Saad Tovar DO, 10 mL at 03/16/25 0819    sodium chloride 0.9 % flush 10 mL, 10 mL, Intravenous, PRN, Saad Tovar DO    sodium chloride 0.9 % infusion 40 mL, 40 mL, Intravenous, PRN, Saad Tovar DO     Imaging results:  Imaging Results (Last 72 Hours)       Procedure Component Value Units Date/Time    XR Chest 1 View [[id number]] Collected: 03/15/25 0815     Updated: 03/15/25 0821    Narrative:      XR CHEST 1 VW    Date of Exam: 3/15/2025 7:57 AM EDT    Indication: worsening hypoxia    Comparison: Chest radiograph 3/14/2025.    Findings:  Implantable cardiac device overlies the left lower chest. Mildly enlarged cardiac silhouette, unchanged. Mixed interstitial and airspace opacities in the bilateral lower lobes, right greater than left, increased compared to yesterday's exam. No sizable   pleural effusion. No pneumothorax. Osseous structures are  unchanged.      Impression:      Impression:  Mixed interstitial and airspace opacities in the bilateral lower lobes, increased compared to yesterday's exam. Findings may represent infection and/or dependent pulmonary edema.      Electronically Signed: Isael Cabrera MD    3/15/2025 8:18 AM EDT    Workstation ID: [id number]    XR Chest AP [[id number]] Collected: 03/14/25 1740     Updated: 03/14/25 1754    Narrative:      XR CHEST AP    Date of Exam: 3/14/2025 5:19 PM EDT    Indication: COVID Evaluation, Cough, Fever    Comparison: November 28, 2023    Findings:  The heart looks enlarged. There is haziness in the right basilar area that may reflect more of a confluence of shadows. There is slight prominence of markings within the interstitium of the lungs which could relate to technique.      Impression:      Impression:  1.Cardiomegaly.  2.Slight prominence of markings within the interstitium of the lungs which could relate to technique.  3.Haziness right lower lobe that probably reflects summation of shadows.        Electronically Signed: Mauro Kilgore MD    3/14/2025 5:51 PM EDT    Workstation ID: [id number]             Ambulatory status:   - no, AX2, [facility]    Social issues:   Social History     Socioeconomic History    Marital status: Single   Tobacco Use    Smoking status: Former     Current packs/day: 1.00     Types: Cigarettes     Passive exposure: Past    Smokeless tobacco: Never   Vaping Use    Vaping status: Never Used   Substance and Sexual Activity    Alcohol use: Not Currently    Drug use: Never    Sexual activity: Defer       NIH Stroke Scale:         Tanya Alexander RN  03/16/25 12:32 EDT

## 2025-03-16 NOTE — PLAN OF CARE
Goal Outcome Evaluation:  Plan of Care Reviewed With: patient, durable power of         Progress: no change  Outcome Evaluation: Discharge teaching, see discharge navigator.  Reviewed d/c instructions with MARY Kraus, son in law; reports ok to send pt back via ambulance.

## 2025-03-16 NOTE — PLAN OF CARE
Goal Outcome Evaluation:  Plan of Care Reviewed With: patient        Progress: no change  Outcome Evaluation: Pt VSS, afib with pacemaker, afebrile and normotensive. pt agitated and restless the majority of the night, prn medication given and patient continues to pull at wires and cords. attempted to reorientate and reeducate patient but he is only alert to self and is baseline confused. called to hospitalist and orders to remove telemetry as patient continuously pulls off electrodes and pulse, 3-4L NC throughout the night.

## 2025-03-16 NOTE — CASE MANAGEMENT/SOCIAL WORK
Continued Stay Note  [facility]     Patient Name: Jose Manuel Wray  MRN: [id number]  Today's Date: 3/16/2025    Admit Date: 3/14/2025    Plan: Return to [facility]   Discharge Plan       Row Name 03/16/25 1017       Plan    Plan Return to [facility]    Patient/Family in Agreement with Plan yes    Plan Comments Call to [facility] and notified that pt will be returning today. Pharmacy updated to [facility]. RN can call report to [phone].                   Discharge Codes    No documentation.                 Expected Discharge Date and Time       Expected Discharge Date Expected Discharge Time    Mar 16, 2025               Pepe Jacobsen RN

## 2025-03-16 NOTE — NURSING NOTE
Nursing IP/EMS Transfer  Jose Manuel Wray  74 y.o.  male    HPI :   Chief Complaint   Patient presents with    Shortness of Breath     Pt comes from nursing home. NH staff told ems they found him to be hypoxic in the 80s, put pt on 2 liters, high 80's, 4 liters 95%. No further report was given by NH staff       Admitting doctor:   Saad Tovar DO    Admitting diagnosis:   The primary encounter diagnosis was COPD exacerbation. Diagnoses of Groin rash and Leg muscle spasm were also pertinent to this visit.    Code status:   Current Code Status       Date Active Code Status Order ID Comments User Context       3/14/2025 2300 No CPR (Do Not Attempt to Resuscitate) [id number]  Saad Tovar DO Inpatient        Question Answer    Code Status (Patient has no pulse and is not breathing) No CPR (Do Not Attempt to Resuscitate)    Medical Interventions (Patient has pulse or is breathing) Full Support    Comments DNR/DNI                    Allergies:   Prednisone    Isolation:   No active isolations    Intake and Output    Intake/Output Summary (Last 24 hours) at 3/16/2025 1154  Last data filed at 3/16/2025 0932  Gross per 24 hour   Intake 180 ml   Output 1100 ml   Net -920 ml       Weight:       03/14/25  2137   Weight: 85.2 kg (187 lb 13.3 oz)       Most recent vitals:   Vitals:    03/16/25 0734 03/16/25 0745 03/16/25 0819 03/16/25 1124   BP:    180/74   BP Location:    Left arm   Patient Position:    Lying   Pulse: 65 67  77   Resp: 20 20  20   Temp:    97.4 °F (36.3 °C)   TempSrc:    Oral   SpO2: 98%  92% 98%   Weight:       Height:           Active LDAs/IV Access:   Lines, Drains & Airways       Active LDAs       None                    Labs (abnormal labs have a star):   Lab Results (last 24 hours)       Procedure Component Value Units Date/Time    Basic Metabolic Panel [[id number]]  (Abnormal) Collected: 03/16/25 0427    Specimen: Blood Updated: 03/16/25 0502     Glucose 124 mg/dL      BUN 33 mg/dL      Creatinine  1.11 mg/dL      Sodium 144 mmol/L      Potassium 5.1 mmol/L      Chloride 109 mmol/L      CO2 26.2 mmol/L      Calcium 9.3 mg/dL      BUN/Creatinine Ratio 29.7     Anion Gap 8.8 mmol/L      eGFR 69.7 mL/min/1.73     Narrative:      GFR Categories in Chronic Kidney Disease (CKD)      GFR Category          GFR (mL/min/1.73)    Interpretation  G1                     90 or greater         Normal or high (1)  G2                      60-89                Mild decrease (1)  G3a                   45-59                Mild to moderate decrease  G3b                   30-44                Moderate to severe decrease  G4                    15-29                Severe decrease  G5                    14 or less           Kidney failure          (1)In the absence of evidence of kidney disease, neither GFR category G1 or G2 fulfill the criteria for CKD.    eGFR calculation 2021 CKD-EPI creatinine equation, which does not include race as a factor    CBC & Differential [[id number]]  (Abnormal) Collected: 03/16/25 0427    Specimen: Blood Updated: 03/16/25 0445    Narrative:      The following orders were created for panel order CBC & Differential.  Procedure                               Abnormality         Status                     ---------                               -----------         ------                     CBC Auto Differential[[id number]]        Abnormal            Final result                 Please view results for these tests on the individual orders.    CBC Auto Differential [[id number]]  (Abnormal) Collected: 03/16/25 0427    Specimen: Blood Updated: 03/16/25 0445     WBC 14.38 10*3/mm3      RBC 2.98 10*6/mm3      Hemoglobin 8.8 g/dL      Hematocrit 29.2 %      MCV 98.0 fL      MCH 29.5 pg      MCHC 30.1 g/dL      RDW 15.8 %      RDW-SD 56.3 fl      MPV 11.7 fL      Platelets 249 10*3/mm3      Neutrophil % 89.0 %      Lymphocyte % 5.6 %      Monocyte % 4.7 %      Eosinophil % 0.0 %      Basophil % 0.1 %      Immature  Grans % 0.6 %      Neutrophils, Absolute 12.81 10*3/mm3      Lymphocytes, Absolute 0.80 10*3/mm3      Monocytes, Absolute 0.67 10*3/mm3      Eosinophils, Absolute 0.00 10*3/mm3      Basophils, Absolute 0.01 10*3/mm3      Immature Grans, Absolute 0.09 10*3/mm3      nRBC 0.0 /100 WBC     Respiratory Panel PCR w/COVID-19(SARS-CoV-2) [facility]ASHA/ESTUARDO/ZAINAB/PAD/COR/ANDRES In-House, NP Swab in UTM/VTM, 2 HR TAT - Swab, Nasopharynx [[id number]]  (Normal) Collected: 03/15/25 0013    Specimen: Swab from Nasopharynx Updated: 03/15/25 1300     ADENOVIRUS, PCR Not Detected     Coronavirus 229E Not Detected     Coronavirus HKU1 Not Detected     Coronavirus NL63 Not Detected     Coronavirus OC43 Not Detected     COVID19 Not Detected     Human Metapneumovirus Not Detected     Human Rhinovirus/Enterovirus Not Detected     Influenza A PCR Not Detected     Influenza B PCR Not Detected     Parainfluenza Virus 1 Not Detected     Parainfluenza Virus 2 Not Detected     Parainfluenza Virus 3 Not Detected     Parainfluenza Virus 4 Not Detected     RSV, PCR Not Detected     Bordetella pertussis pcr Not Detected     Bordetella parapertussis PCR Not Detected     Chlamydophila pneumoniae PCR Not Detected     Mycoplasma pneumo by PCR Not Detected    Narrative:      In the setting of a positive respiratory panel with a viral infection PLUS a negative procalcitonin without other underlying concern for bacterial infection, consider observing off antibiotics or discontinuation of antibiotics and continue supportive care. If the respiratory panel is positive for atypical bacterial infection (Bordetella pertussis, Chlamydophila pneumoniae, or Mycoplasma pneumoniae), consider antibiotic de-escalation to target atypical bacterial infection.             EKG:   ECG 12 Lead   ED Interpretation   Carole Oquendo PA-C     3/14/2025  7:36 PM   ECG 12 Lead         Date/Time: 3/14/2025 4:50 PM      Performed by: Carole Oquendo PA-C   Authorized by: Dewey Barker MD   Interpreted by ED physician   Rhythm: sinus rhythm   Ectopy: atrial premature contractions   Rate: normal   BPM: 72   Clinical impression: non-specific ECG   Comments: QTc 432, normal axis      ECG 12 Lead Dyspnea   Final Result   HEART RATE=72  bpm   RR Xmiydmje=008  ms   AR Hvccmito=858  ms   P Horizontal Axis=  deg   P Front Axis=Invalid  deg   QRSD Zimcqywr=931  ms   QT Cfefvcdz=462  ms   JRqB=931  ms   QRS Axis=105  deg   T Wave Axis=67  deg   - ABNORMAL ECG -   Atrial fibrillation   Nonspecific  intraventricular conduction delay   Probable  lateral infarct, old   When compared with ECG of 28-Nov-2023 11:23:16,   No significant change   Electronically Signed By: Ron Cope ([facility]) 2025-03-14 22:24:18   Date and Time of Study:2025-03-14 16:50:07          Current Medications:     Current Facility-Administered Medications:     acetaminophen (TYLENOL) tablet 650 mg, 650 mg, Oral, Q4H PRN **OR** acetaminophen (TYLENOL) 160 MG/5ML oral solution 650 mg, 650 mg, Oral, Q4H PRN **OR** acetaminophen (TYLENOL) suppository 650 mg, 650 mg, Rectal, Q4H PRN, Sada Tovar DO    apixaban (ELIQUIS) tablet 5 mg, 5 mg, Oral, Q12H, Chino Carvalho DO, 5 mg at 03/16/25 0819    sennosides-docusate (PERICOLACE) 8.6-50 MG per tablet 2 tablet, 2 tablet, Oral, BID PRN **AND** polyethylene glycol (MIRALAX) packet 17 g, 17 g, Oral, Daily PRN **AND** bisacodyl (DULCOLAX) EC tablet 5 mg, 5 mg, Oral, Daily PRN **AND** bisacodyl (DULCOLAX) suppository 10 mg, 10 mg, Rectal, Daily PRN, Saad Tovar DO    Calcium Replacement - Follow Nurse / BPA Driven Protocol, , Not Applicable, PRN, Saad Tovar DO    diazePAM (VALIUM) tablet 2.5 mg, 2.5 mg, Oral, Q6H PRN, Carole Oquendo PA-C, 2.5 mg at 03/16/25 0318    guaiFENesin (MUCINEX) 12 hr tablet 600 mg, 600 mg, Oral, Q12H, Saad Tovar DO, 600 mg at 03/16/25 0819    ipratropium-albuterol (DUO-NEB) nebulizer solution 3 mL, 3 mL, Nebulization, Q4H PRN, Saad Tovar,      ipratropium-albuterol (DUO-NEB) nebulizer solution 3 mL, 3 mL, Nebulization, 4x Daily - RT, Saad Tovar DO, 3 mL at 03/16/25 0733    LORazepam (ATIVAN) injection 1 mg, 1 mg, Intravenous, Q6H PRN, Saad Tovar DO, 1 mg at 03/16/25 0009    Magnesium Standard Dose Replacement - Follow Nurse / BPA Driven Protocol, , Not Applicable, PRN, Saad Tovar DO    methylPREDNISolone sodium succinate (SOLU-Medrol) injection 40 mg, 40 mg, Intravenous, Q8H, Saad Tovar DO, 40 mg at 03/16/25 0617    metoprolol tartrate (LOPRESSOR) tablet 25 mg, 25 mg, Oral, Q12H, Chino Carvalho DO, 25 mg at 03/16/25 0819    ondansetron ODT (ZOFRAN-ODT) disintegrating tablet 4 mg, 4 mg, Oral, Q6H PRN **OR** ondansetron (ZOFRAN) injection 4 mg, 4 mg, Intravenous, Q6H PRN, Saad Tovar DO    Phosphorus Replacement - Follow Nurse / BPA Driven Protocol, , Not Applicable, PRN, Saad Tovar DO    Potassium Replacement - Follow Nurse / BPA Driven Protocol, , Not Applicable, PRNGuy Paul A, DO    sodium chloride 0.9 % flush 10 mL, 10 mL, Intravenous, Q12H, Saad Tovar DO, 10 mL at 03/16/25 0819    sodium chloride 0.9 % flush 10 mL, 10 mL, Intravenous, PRN, Saad Tovar DO    sodium chloride 0.9 % infusion 40 mL, 40 mL, Intravenous, PRN, Saad Tovar DO     Imaging results:  Imaging Results (Last 72 Hours)       Procedure Component Value Units Date/Time    XR Chest 1 View [[id number]] Collected: 03/15/25 0815     Updated: 03/15/25 0821    Narrative:      XR CHEST 1 VW    Date of Exam: 3/15/2025 7:57 AM EDT    Indication: worsening hypoxia    Comparison: Chest radiograph 3/14/2025.    Findings:  Implantable cardiac device overlies the left lower chest. Mildly enlarged cardiac silhouette, unchanged. Mixed interstitial and airspace opacities in the bilateral lower lobes, right greater than left, increased compared to yesterday's exam. No sizable   pleural effusion. No pneumothorax. Osseous structures are  unchanged.      Impression:      Impression:  Mixed interstitial and airspace opacities in the bilateral lower lobes, increased compared to yesterday's exam. Findings may represent infection and/or dependent pulmonary edema.      Electronically Signed: Isael Cabrera MD    3/15/2025 8:18 AM EDT    Workstation ID: [id number]    XR Chest AP [[id number]] Collected: 03/14/25 1740     Updated: 03/14/25 1754    Narrative:      XR CHEST AP    Date of Exam: 3/14/2025 5:19 PM EDT    Indication: COVID Evaluation, Cough, Fever    Comparison: November 28, 2023    Findings:  The heart looks enlarged. There is haziness in the right basilar area that may reflect more of a confluence of shadows. There is slight prominence of markings within the interstitium of the lungs which could relate to technique.      Impression:      Impression:  1.Cardiomegaly.  2.Slight prominence of markings within the interstitium of the lungs which could relate to technique.  3.Haziness right lower lobe that probably reflects summation of shadows.        Electronically Signed: Mauro Kilgore MD    3/14/2025 5:51 PM EDT    Workstation ID: [id number]             Ambulatory status:   - AX2 st pivot to wc    Social issues:   Social History     Socioeconomic History    Marital status: Single   Tobacco Use    Smoking status: Former     Current packs/day: 1.00     Types: Cigarettes     Passive exposure: Past    Smokeless tobacco: Never   Vaping Use    Vaping status: Never Used   Substance and Sexual Activity    Alcohol use: Not Currently    Drug use: Never    Sexual activity: Defer       NIH Stroke Scale:         Tanya Alexander RN  03/16/25 11:54 EDT

## 2025-03-16 NOTE — SIGNIFICANT NOTE
"Pt awake/alert upon arrival, finished with breakfast tray. Pt was encouraged to get up to chair, refused x 3 attempts stating \"I sat up from 8 AM to 8 PM yesterday and I'm tired\". Pt requested cup of black coffee which was provided.   "

## 2025-03-16 NOTE — DISCHARGE INSTR - APPOINTMENTS
Pt will need to call to schedule a follow up appointment with PCP.                  [phone]

## 2025-03-16 NOTE — DISCHARGE SUMMARY
[facility]  DISCHARGE SUMMARY        Date of Admission: 3/14/2025    Date of Discharge:  3/16/2025    Length of stay:  LOS: 1 day     Presenting Problem:   COPD (chronic obstructive pulmonary disease) [J44.9]  COPD exacerbation [J44.1]  Leg muscle spasm [M62.838]  Groin rash [R21]    Hospital Course     Active Diagnosis During Hospital Stay/Discharge Diagnoses/Course by Diagnoses:       Active Hospital Problems    Diagnosis  POA    **COPD (chronic obstructive pulmonary disease) [J44.9]  Yes      Resolved Hospital Problems   No resolved problems to display.     Acute on chronic hypoxic respiratory failure  Likely secondary to acute COPD exacerbation   Wears 2 L oxygen at baseline;   CXR reviewed; no acute finding  COVID, Flu, RSV, RVP negative  Continue as needed nebs p.o. steroids Mucinex at discharge  IS/flutter valve while at SNF     Atrial fibrillation   With hx of pacemaker placement  Continue home rate control meds and AC with Eliquis      CKD III  Creatinine 1.3; near baseline  Monitor     Hx of CVA   With residual aphasia       Hospital Course  Patient is a 74 y.o. male presented with issues as noted above.  Over the course of several days of treatment he was improved and back to baseline.  At this point he was felt stable to discharge back to long-term care facility      Procedures Performed:as noted          Consults:   Consults       No orders found from 2/13/2025 to 3/15/2025.              Pertinent  and/or Most Recent Results       Pertinent Test Results:     Results from last 7 days   Lab Units 03/16/25  0427 03/15/25  0818 03/14/25  1640   WBC 10*3/mm3 14.38* 9.89 11.19*   HEMOGLOBIN g/dL 8.8* 8.9* 8.9*   HEMATOCRIT % 29.2* 29.1* 28.6*   PLATELETS 10*3/mm3 249 232 259     Results from last 7 days   Lab Units 03/16/25  0427 03/15/25  0817 03/14/25  1640   SODIUM mmol/L 144 140 136   POTASSIUM mmol/L 5.1 4.4 4.5   CHLORIDE mmol/L 109* 105 103   CO2 mmol/L 26.2 24.3 23.0    BUN mg/dL 33* 31* 36*   CREATININE mg/dL 1.11 1.30* 1.52*   CALCIUM mg/dL 9.3 9.2 9.4   BILIRUBIN mg/dL  --   --  0.2   ALK PHOS U/L  --   --  87   ALT (SGPT) U/L  --   --  7   AST (SGOT) U/L  --   --  18   GLUCOSE mg/dL 124* 132* 103*       Microbiology Results (last 10 days)       Procedure Component Value - Date/Time    Respiratory Panel PCR w/COVID-19(SARS-CoV-2) LATASHA/ESTUARDO/ZAINAB/PAD/COR/ANDRES In-House, NP Swab in UTM/VTM, 2 HR TAT - Swab, Nasopharynx [[id number]]  (Normal) Collected: 03/15/25 0013    Lab Status: Final result Specimen: Swab from Nasopharynx Updated: 03/15/25 1300     ADENOVIRUS, PCR Not Detected     Coronavirus 229E Not Detected     Coronavirus HKU1 Not Detected     Coronavirus NL63 Not Detected     Coronavirus OC43 Not Detected     COVID19 Not Detected     Human Metapneumovirus Not Detected     Human Rhinovirus/Enterovirus Not Detected     Influenza A PCR Not Detected     Influenza B PCR Not Detected     Parainfluenza Virus 1 Not Detected     Parainfluenza Virus 2 Not Detected     Parainfluenza Virus 3 Not Detected     Parainfluenza Virus 4 Not Detected     RSV, PCR Not Detected     Bordetella pertussis pcr Not Detected     Bordetella parapertussis PCR Not Detected     Chlamydophila pneumoniae PCR Not Detected     Mycoplasma pneumo by PCR Not Detected    Narrative:      In the setting of a positive respiratory panel with a viral infection PLUS a negative procalcitonin without other underlying concern for bacterial infection, consider observing off antibiotics or discontinuation of antibiotics and continue supportive care. If the respiratory panel is positive for atypical bacterial infection (Bordetella pertussis, Chlamydophila pneumoniae, or Mycoplasma pneumoniae), consider antibiotic de-escalation to target atypical bacterial infection.    COVID-19, FLU A/B, RSV PCR 1 HR TAT - Swab, Nasopharynx [[id number]]  (Normal) Collected: 03/14/25 7022    Lab Status: Final result Specimen: Swab from Nasopharynx  Updated: 03/14/25 1834     COVID19 Not Detected     Influenza A PCR Not Detected     Influenza B PCR Not Detected     RSV, PCR Not Detected    Narrative:      Fact sheet for providers: https://www.fda.gov/media/647897/download    Fact sheet for patients: https://www.fda.gov/media/198907/download    Test performed by PCR.            Results for orders placed during the hospital encounter of 07/21/21    Adult Transthoracic Echo Complete W/ Cont if Necessary Per Protocol    Interpretation Summary  · Estimated left ventricular EF = 60% Left ventricular systolic function is normal.  · Normal left ventricular cavity size and wall thickness noted. All left ventricular wall segments contract normally.      Imaging Results (All)       Procedure Component Value Units Date/Time    XR Chest 1 View [[id number]] Collected: 03/15/25 0815     Updated: 03/15/25 0821    Narrative:      XR CHEST 1 VW    Date of Exam: 3/15/2025 7:57 AM EDT    Indication: worsening hypoxia    Comparison: Chest radiograph 3/14/2025.    Findings:  Implantable cardiac device overlies the left lower chest. Mildly enlarged cardiac silhouette, unchanged. Mixed interstitial and airspace opacities in the bilateral lower lobes, right greater than left, increased compared to yesterday's exam. No sizable   pleural effusion. No pneumothorax. Osseous structures are unchanged.      Impression:      Impression:  Mixed interstitial and airspace opacities in the bilateral lower lobes, increased compared to yesterday's exam. Findings may represent infection and/or dependent pulmonary edema.      Electronically Signed: Isael Cabrera MD    3/15/2025 8:18 AM EDT    Workstation ID: [id number]    XR Chest AP [[id number]] Collected: 03/14/25 1740     Updated: 03/14/25 1754    Narrative:      XR CHEST AP    Date of Exam: 3/14/2025 5:19 PM EDT    Indication: COVID Evaluation, Cough, Fever    Comparison: November 28, 2023    Findings:  The heart looks enlarged. There is haziness in  the right basilar area that may reflect more of a confluence of shadows. There is slight prominence of markings within the interstitium of the lungs which could relate to technique.      Impression:      Impression:  1.Cardiomegaly.  2.Slight prominence of markings within the interstitium of the lungs which could relate to technique.  3.Haziness right lower lobe that probably reflects summation of shadows.        Electronically Signed: Mauro Kilgore MD    3/14/2025 5:51 PM EDT    Workstation ID: [id number]              Day of Discharge       Condition on Discharge:  stable     Vital Signs  Temp:  [97 °F (36.1 °C)-98.4 °F (36.9 °C)] 97.4 °F (36.3 °C)  Heart Rate:  [50-88] 67  Resp:  [18-34] 20  BP: (117-158)/(61-78) 117/78    Physical Exam:  Physical Exam  Vitals reviewed.   Cardiovascular:      Rate and Rhythm: Normal rate.   Pulmonary:      Effort: No respiratory distress.   Abdominal:      Palpations: Abdomen is soft.   Neurological:      Mental Status: He is alert. Mental status is at baseline.   Psychiatric:         Mood and Affect: Mood normal.         Behavior: Behavior normal.             Discharge Disposition  Long Term Care (DC - External)    Discharge Medications     Discharge Medications        New Medications        Instructions Start Date   guaiFENesin 600 MG 12 hr tablet  Commonly known as: MUCINEX   600 mg, Oral, Every 12 Hours Scheduled      predniSONE 20 MG tablet  Commonly known as: DELTASONE   40 mg, Oral, Daily             Continue These Medications        Instructions Start Date   acetaminophen 325 MG tablet  Commonly known as: TYLENOL   650 mg, Oral, Every 6 Hours PRN      albuterol sulfate  (90 Base) MCG/ACT inhaler  Commonly known as: PROVENTIL HFA;VENTOLIN HFA;PROAIR HFA   ProAir  (90 Base) MCG/ACT Inhalation Aerosol Solution; Patient Sig: ProAir  (90 Base) MCG/ACT Inhalation Aerosol Solution ; 8; 0; 19-Nov-2014; Active      allopurinol 100 MG tablet  Commonly known as:  ZYLOPRIM   50 mg, Oral, Daily      apixaban 5 MG tablet tablet  Commonly known as: ELIQUIS   5 mg, Oral, Every 12 Hours Scheduled      atorvastatin 40 MG tablet  Commonly known as: LIPITOR   40 mg, Oral, Nightly      calcium carbonate 600 MG tablet  Commonly known as: OS-KODY   600 mg, Oral, Daily      clopidogrel 75 MG tablet  Commonly known as: PLAVIX   75 mg, Oral, Daily      clotrimazole 1 % cream  Commonly known as: LOTRIMIN   1 Application, Topical, 2 Times Daily      COMBIVENT IN   Inhalation      folic acid 1 MG tablet  Commonly known as: FOLVITE   1 mg, Oral, Daily      furosemide 40 MG tablet  Commonly known as: LASIX   40 mg, Oral, 2 Times Daily      HYDROcodone-acetaminophen 5-325 MG per tablet  Commonly known as: NORCO   1 tablet, Oral, Every 4 Hours PRN      hydrOXYzine 25 MG tablet  Commonly known as: ATARAX   25 mg, Oral, 3 Times Daily PRN      levothyroxine 50 MCG tablet  Commonly known as: SYNTHROID, LEVOTHROID   50 mcg, Oral, Daily, Takes in the afternoon       losartan 25 MG tablet  Commonly known as: COZAAR   25 mg, Oral, Daily      metoprolol tartrate 25 MG tablet  Commonly known as: LOPRESSOR   25 mg, Oral, 2 Times Daily      mirtazapine 15 MG disintegrating tablet  Commonly known as: REMERON SOL-TAB   15 mg, Translingual, Nightly      multivitamin with minerals tablet tablet   1 tablet, Oral, Daily      pantoprazole 40 MG EC tablet  Commonly known as: PROTONIX   40 mg, Oral, Daily      potassium chloride 20 MEQ CR tablet  Commonly known as: KLOR-CON M20   20 mEq, Oral, 2 Times Daily      povidone-iodine 10 % external solution 10%  Commonly known as: BETADINE   1 Application, Topical, As Needed, Apply to scabs & right dorsal foot wound Q shift & PRN       pregabalin 50 MG capsule  Commonly known as: LYRICA   50 mg, Oral, 2 Times Daily      saccharomyces boulardii 250 MG capsule  Commonly known as: FLORASTOR   250 mg, Oral, 2 Times Daily      sennosides-docusate 8.6-50 MG per tablet  Commonly  known as: PERICOLACE   1 tablet, Oral, Daily      sertraline 25 MG tablet  Commonly known as: ZOLOFT   25 mg, Oral, Daily      tamsulosin 0.4 MG capsule 24 hr capsule  Commonly known as: FLOMAX   1 capsule, Oral, Daily      topiramate 25 MG tablet  Commonly known as: TOPAMAX   25 mg, Oral, 2 Times Daily      Trelegy Ellipta 200-62.5-25 MCG/INH inhaler  Generic drug: Fluticasone-Umeclidin-Vilant   No dose, route, or frequency recorded.      vitamin B-12 1000 MCG tablet  Commonly known as: CYANOCOBALAMIN   1,000 mcg, Oral, Daily      vitamin C 250 MG tablet  Commonly known as: ASCORBIC ACID   500 mg, Oral, Daily      Zenpep 33922-81729 units capsule delayed-release particles  Generic drug: Pancrelipase (Lip-Prot-Amyl)   2 capsules, Oral, 2 Times Daily With Meals               Discharge Diet:   Diet Instructions       Diet: Regular/House Diet; Thin (IDDSI 0)      Discharge Diet: Regular/House Diet    Fluid Consistency: Thin (IDDSI 0)            Activity at Discharge:   Activity Instructions       Activity as Tolerated              Follow-up Appointments  No future appointments.  Additional Instructions for the Follow-ups that You Need to Schedule       Discharge Follow-up with PCP   As directed       Currently Documented PCP:    Debbie Bello APRN    PCP Phone Number:    [phone]     Follow Up Details: one week                Test Results Pending at Discharge       Risk for Readmission (LACE) Score: 9 (3/16/2025  6:00 AM)        Time: Discharge 32 min with face-to-face history exam, writing of prescriptions, and documenting discharge data including care coordination with the nursing staff.      Electronically signed by Chino Carvalho DO, 03/16/25, 10:12 EDT.      Note disclaimer: At [facility], we believe that sharing information builds trust and better relationships. You are receiving this note because you recently visited [facility]. It is possible you will see health information before a provider has  talked with you about it. This kind of information can be easy to misunderstand. To help you fully understand what it means for your health, we urge you to discuss this note with your provider.

## 2025-03-16 NOTE — SIGNIFICANT NOTE
03/16/25 1120   Clinician Notification   Reason for Communication   (report called to [facility], spoke with Darnell. [phone])   Clinician Name BRY Patrick at [facility]   Method of Communication Call

## 2025-05-05 ENCOUNTER — LAB REQUISITION (OUTPATIENT)
Dept: LAB | Facility: HOSPITAL | Age: 75
End: 2025-05-05
Payer: MEDICARE

## 2025-05-05 DIAGNOSIS — R60.9 EDEMA, UNSPECIFIED: ICD-10-CM

## 2025-05-05 LAB
ANION GAP SERPL CALCULATED.3IONS-SCNC: 10.8 MMOL/L (ref 5–15)
BASOPHILS # BLD AUTO: 0.04 10*3/MM3 (ref 0–0.2)
BASOPHILS NFR BLD AUTO: 0.6 % (ref 0–1.5)
BUN SERPL-MCNC: 32 MG/DL (ref 8–23)
BUN/CREAT SERPL: 19.8 (ref 7–25)
CALCIUM SPEC-SCNC: 8.6 MG/DL (ref 8.6–10.5)
CHLORIDE SERPL-SCNC: 108 MMOL/L (ref 98–107)
CO2 SERPL-SCNC: 25.2 MMOL/L (ref 22–29)
CREAT SERPL-MCNC: 1.62 MG/DL (ref 0.76–1.27)
DEPRECATED RDW RBC AUTO: 74.6 FL (ref 37–54)
EGFRCR SERPLBLD CKD-EPI 2021: 44.3 ML/MIN/1.73
EOSINOPHIL # BLD AUTO: 0.38 10*3/MM3 (ref 0–0.4)
EOSINOPHIL NFR BLD AUTO: 5.9 % (ref 0.3–6.2)
ERYTHROCYTE [DISTWIDTH] IN BLOOD BY AUTOMATED COUNT: 20 % (ref 12.3–15.4)
GLUCOSE SERPL-MCNC: 99 MG/DL (ref 65–99)
HCT VFR BLD AUTO: 26.8 % (ref 37.5–51)
HGB BLD-MCNC: 8 G/DL (ref 13–17.7)
IMM GRANULOCYTES # BLD AUTO: 0.03 10*3/MM3 (ref 0–0.05)
IMM GRANULOCYTES NFR BLD AUTO: 0.5 % (ref 0–0.5)
LYMPHOCYTES # BLD AUTO: 1.08 10*3/MM3 (ref 0.7–3.1)
LYMPHOCYTES NFR BLD AUTO: 16.9 % (ref 19.6–45.3)
MCH RBC QN AUTO: 30.4 PG (ref 26.6–33)
MCHC RBC AUTO-ENTMCNC: 29.9 G/DL (ref 31.5–35.7)
MCV RBC AUTO: 101.9 FL (ref 79–97)
MONOCYTES # BLD AUTO: 0.59 10*3/MM3 (ref 0.1–0.9)
MONOCYTES NFR BLD AUTO: 9.2 % (ref 5–12)
NEUTROPHILS NFR BLD AUTO: 4.27 10*3/MM3 (ref 1.7–7)
NEUTROPHILS NFR BLD AUTO: 66.9 % (ref 42.7–76)
NRBC BLD AUTO-RTO: 0 /100 WBC (ref 0–0.2)
NT-PROBNP SERPL-MCNC: 7247 PG/ML (ref 0–900)
PLATELET # BLD AUTO: 165 10*3/MM3 (ref 140–450)
PMV BLD AUTO: 12.6 FL (ref 6–12)
POTASSIUM SERPL-SCNC: 4.6 MMOL/L (ref 3.5–5.2)
RBC # BLD AUTO: 2.63 10*6/MM3 (ref 4.14–5.8)
SODIUM SERPL-SCNC: 144 MMOL/L (ref 136–145)
WBC NRBC COR # BLD AUTO: 6.39 10*3/MM3 (ref 3.4–10.8)

## 2025-05-05 PROCEDURE — 80048 BASIC METABOLIC PNL TOTAL CA: CPT | Performed by: FAMILY MEDICINE

## 2025-05-05 PROCEDURE — 85025 COMPLETE CBC W/AUTO DIFF WBC: CPT | Performed by: FAMILY MEDICINE

## 2025-05-05 PROCEDURE — 83880 ASSAY OF NATRIURETIC PEPTIDE: CPT | Performed by: FAMILY MEDICINE
